# Patient Record
Sex: FEMALE | Race: WHITE | NOT HISPANIC OR LATINO | Employment: FULL TIME | URBAN - METROPOLITAN AREA
[De-identification: names, ages, dates, MRNs, and addresses within clinical notes are randomized per-mention and may not be internally consistent; named-entity substitution may affect disease eponyms.]

---

## 2017-01-02 ENCOUNTER — TELEPHONE (OUTPATIENT)
Dept: PSYCHIATRY | Facility: HOSPITAL | Age: 22
End: 2017-01-02

## 2017-01-03 ENCOUNTER — TELEPHONE (OUTPATIENT)
Dept: PSYCHIATRY | Facility: HOSPITAL | Age: 22
End: 2017-01-03

## 2017-06-03 ENCOUNTER — HOSPITAL ENCOUNTER (INPATIENT)
Facility: HOSPITAL | Age: 22
LOS: 1 days | End: 2017-06-04
Attending: EMERGENCY MEDICINE | Admitting: HOSPITALIST

## 2017-06-03 ENCOUNTER — APPOINTMENT (OUTPATIENT)
Dept: CT IMAGING | Facility: HOSPITAL | Age: 22
End: 2017-06-03

## 2017-06-03 DIAGNOSIS — T39.1X2A TYLENOL OVERDOSE, INTENTIONAL SELF-HARM, INITIAL ENCOUNTER (HCC): Primary | ICD-10-CM

## 2017-06-03 PROBLEM — R45.851 SUICIDAL IDEATION: Status: ACTIVE | Noted: 2017-06-03

## 2017-06-03 PROBLEM — T39.1X1A TYLENOL OVERDOSE: Status: ACTIVE | Noted: 2017-06-03

## 2017-06-03 PROBLEM — D64.9 ANEMIA: Status: ACTIVE | Noted: 2017-06-03

## 2017-06-03 PROBLEM — D68.00 VON WILLEBRAND DISEASE (HCC): Status: ACTIVE | Noted: 2017-06-03

## 2017-06-03 LAB
ALBUMIN SERPL-MCNC: 3.9 G/DL (ref 3.5–5.2)
ALBUMIN/GLOB SERPL: 1.6 G/DL
ALP SERPL-CCNC: 53 U/L (ref 39–117)
ALT SERPL W P-5'-P-CCNC: 11 U/L (ref 1–33)
AMPHET+METHAMPHET UR QL: NEGATIVE
ANION GAP SERPL CALCULATED.3IONS-SCNC: 10.2 MMOL/L
APAP SERPL-MCNC: 36 MCG/ML (ref 10–30)
APAP SERPL-MCNC: 89 MCG/ML (ref 10–30)
AST SERPL-CCNC: 17 U/L (ref 1–32)
BARBITURATES UR QL SCN: NEGATIVE
BASOPHILS # BLD AUTO: 0.02 10*3/MM3 (ref 0–0.2)
BASOPHILS NFR BLD AUTO: 0.3 % (ref 0–1.5)
BENZODIAZ UR QL SCN: NEGATIVE
BILIRUB SERPL-MCNC: <0.2 MG/DL (ref 0.1–1.2)
BUN BLD-MCNC: 13 MG/DL (ref 6–20)
BUN/CREAT SERPL: 15.7 (ref 7–25)
CALCIUM SPEC-SCNC: 8.2 MG/DL (ref 8.6–10.5)
CANNABINOIDS SERPL QL: NEGATIVE
CHLORIDE SERPL-SCNC: 105 MMOL/L (ref 98–107)
CO2 SERPL-SCNC: 23.8 MMOL/L (ref 22–29)
COCAINE UR QL: NEGATIVE
CREAT BLD-MCNC: 0.83 MG/DL (ref 0.57–1)
DEPRECATED RDW RBC AUTO: 45.8 FL (ref 37–54)
EOSINOPHIL # BLD AUTO: 0.15 10*3/MM3 (ref 0–0.7)
EOSINOPHIL NFR BLD AUTO: 2.6 % (ref 0.3–6.2)
ERYTHROCYTE [DISTWIDTH] IN BLOOD BY AUTOMATED COUNT: 13.7 % (ref 11.7–13)
ETHANOL BLD-MCNC: <10 MG/DL (ref 0–10)
ETHANOL UR QL: <0.01 %
GFR SERPL CREATININE-BSD FRML MDRD: 85 ML/MIN/1.73
GLOBULIN UR ELPH-MCNC: 2.4 GM/DL
GLUCOSE BLD-MCNC: 106 MG/DL (ref 65–99)
HCG SERPL QL: NEGATIVE
HCT VFR BLD AUTO: 35.1 % (ref 35.6–45.5)
HGB BLD-MCNC: 11.5 G/DL (ref 11.9–15.5)
IMM GRANULOCYTES # BLD: 0 10*3/MM3 (ref 0–0.03)
IMM GRANULOCYTES NFR BLD: 0 % (ref 0–0.5)
INR PPP: 1.25 (ref 0.9–1.1)
INR PPP: 1.34 (ref 0.9–1.1)
LYMPHOCYTES # BLD AUTO: 2.93 10*3/MM3 (ref 0.9–4.8)
LYMPHOCYTES NFR BLD AUTO: 50.3 % (ref 19.6–45.3)
MCH RBC QN AUTO: 29.9 PG (ref 26.9–32)
MCHC RBC AUTO-ENTMCNC: 32.8 G/DL (ref 32.4–36.3)
MCV RBC AUTO: 91.4 FL (ref 80.5–98.2)
METHADONE UR QL SCN: NEGATIVE
MONOCYTES # BLD AUTO: 0.42 10*3/MM3 (ref 0.2–1.2)
MONOCYTES NFR BLD AUTO: 7.2 % (ref 5–12)
NEUTROPHILS # BLD AUTO: 2.31 10*3/MM3 (ref 1.9–8.1)
NEUTROPHILS NFR BLD AUTO: 39.6 % (ref 42.7–76)
OPIATES UR QL: NEGATIVE
OXYCODONE UR QL SCN: NEGATIVE
PLATELET # BLD AUTO: 293 10*3/MM3 (ref 140–500)
PMV BLD AUTO: 9.1 FL (ref 6–12)
POTASSIUM BLD-SCNC: 4.1 MMOL/L (ref 3.5–5.2)
PROT SERPL-MCNC: 6.3 G/DL (ref 6–8.5)
PROTHROMBIN TIME: 15.2 SECONDS (ref 11.7–14.2)
PROTHROMBIN TIME: 16.1 SECONDS (ref 11.7–14.2)
RBC # BLD AUTO: 3.84 10*6/MM3 (ref 3.9–5.2)
SALICYLATES SERPL-MCNC: <0.3 MG/DL
SODIUM BLD-SCNC: 139 MMOL/L (ref 136–145)
WBC NRBC COR # BLD: 5.83 10*3/MM3 (ref 4.5–10.7)

## 2017-06-03 PROCEDURE — 80307 DRUG TEST PRSMV CHEM ANLYZR: CPT | Performed by: EMERGENCY MEDICINE

## 2017-06-03 PROCEDURE — 99285 EMERGENCY DEPT VISIT HI MDM: CPT

## 2017-06-03 PROCEDURE — 25010000002 ONDANSETRON PER 1 MG

## 2017-06-03 PROCEDURE — 85610 PROTHROMBIN TIME: CPT | Performed by: EMERGENCY MEDICINE

## 2017-06-03 PROCEDURE — 90791 PSYCH DIAGNOSTIC EVALUATION: CPT

## 2017-06-03 PROCEDURE — 80053 COMPREHEN METABOLIC PANEL: CPT | Performed by: EMERGENCY MEDICINE

## 2017-06-03 PROCEDURE — 74176 CT ABD & PELVIS W/O CONTRAST: CPT

## 2017-06-03 PROCEDURE — 85025 COMPLETE CBC W/AUTO DIFF WBC: CPT | Performed by: EMERGENCY MEDICINE

## 2017-06-03 PROCEDURE — 84703 CHORIONIC GONADOTROPIN ASSAY: CPT | Performed by: EMERGENCY MEDICINE

## 2017-06-03 PROCEDURE — 85610 PROTHROMBIN TIME: CPT | Performed by: HOSPITALIST

## 2017-06-03 PROCEDURE — 80307 DRUG TEST PRSMV CHEM ANLYZR: CPT | Performed by: HOSPITALIST

## 2017-06-03 RX ORDER — SODIUM CHLORIDE 9 MG/ML
75 INJECTION, SOLUTION INTRAVENOUS CONTINUOUS
Status: ACTIVE | OUTPATIENT
Start: 2017-06-03 | End: 2017-06-04

## 2017-06-03 RX ORDER — ONDANSETRON 2 MG/ML
4 INJECTION INTRAMUSCULAR; INTRAVENOUS ONCE
Status: COMPLETED | OUTPATIENT
Start: 2017-06-03 | End: 2017-06-03

## 2017-06-03 RX ORDER — SODIUM CHLORIDE 0.9 % (FLUSH) 0.9 %
1-10 SYRINGE (ML) INJECTION AS NEEDED
Status: DISCONTINUED | OUTPATIENT
Start: 2017-06-03 | End: 2017-06-04 | Stop reason: HOSPADM

## 2017-06-03 RX ORDER — ONDANSETRON 2 MG/ML
INJECTION INTRAMUSCULAR; INTRAVENOUS
Status: COMPLETED
Start: 2017-06-03 | End: 2017-06-03

## 2017-06-03 RX ORDER — ONDANSETRON 2 MG/ML
4 INJECTION INTRAMUSCULAR; INTRAVENOUS EVERY 6 HOURS PRN
Status: DISCONTINUED | OUTPATIENT
Start: 2017-06-03 | End: 2017-06-04 | Stop reason: HOSPADM

## 2017-06-03 RX ADMIN — ACETYLCYSTEINE 7840 MG: 200 INJECTION, SOLUTION INTRAVENOUS at 08:07

## 2017-06-03 RX ADMIN — ACETYLCYSTEINE 2620 MG: 6 INJECTION, SOLUTION INTRAVENOUS at 11:31

## 2017-06-03 RX ADMIN — ACETYLCYSTEINE 5220 MG: 200 INJECTION, SOLUTION INTRAVENOUS at 15:24

## 2017-06-03 RX ADMIN — SODIUM CHLORIDE 75 ML/HR: 9 INJECTION, SOLUTION INTRAVENOUS at 23:49

## 2017-06-03 RX ADMIN — ONDANSETRON 4 MG: 2 INJECTION INTRAMUSCULAR; INTRAVENOUS at 08:40

## 2017-06-03 RX ADMIN — SODIUM CHLORIDE 75 ML/HR: 9 INJECTION, SOLUTION INTRAVENOUS at 10:56

## 2017-06-03 RX ADMIN — ACETYLCYSTEINE 7840 MG: 6 INJECTION, SOLUTION INTRAVENOUS at 10:35

## 2017-06-03 RX ADMIN — SODIUM CHLORIDE 1000 ML: 9 INJECTION, SOLUTION INTRAVENOUS at 08:18
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
## 2017-06-03 NOTE — H&P
[facility]    Patient Identification:  Name: Damien Andrade  Age: 21 y.o.  Sex: female  :  1995  MRN: [id number]         Primary Care Physician: Tila Lawson MD    Chief complaint:  Overdose    Subjective   Patient is a 21 y.o. female took approximately 20*strength Tylenol at 11 PM last night with suicidal ideation. She also had 4 drinks prior. She has had some abdominal discomfort as well as nausea that has just happened on the floor. She denies any fevers, chills, chest pain. Tylenol level was 89 at 0637 which is above the treatment threshold on the nomogram.    Past Medical History:   Diagnosis Date   • Von Willebrand disease    • Willebrand-Juergens disease      Past Surgical History:   Procedure Laterality Date   • INCISION AND DRAINAGE ABSCESS Right     right leg     Current medications reviewed.    History reviewed. No pertinent family history.  Social History   Substance Use Topics   • Smoking status: Current Some Day Smoker   • Smokeless tobacco: None      Comment: socially    • Alcohol use Yes      Comment: socially      Review of Systems   Constitutional: Negative.  Negative for chills and fever.   HENT: Negative.    Eyes: Negative.    Respiratory: Negative.  Negative for chest tightness and shortness of breath.    Cardiovascular: Negative.  Negative for chest pain.   Gastrointestinal: Positive for abdominal pain and nausea. Negative for diarrhea and vomiting.   Endocrine: Negative.    Genitourinary: Negative.    Musculoskeletal: Negative.    Skin: Negative.    Allergic/Immunologic: Negative.    Neurological: Negative.    Hematological: Negative.    Psychiatric/Behavioral: Positive for self-injury and suicidal ideas.      Objective      Vital Signs  Temp:  [97.6 °F (36.4 °C)-98.2 °F (36.8 °C)] 98.2 °F (36.8 °C)  Heart Rate:  [47-65] 65  Resp:  [16-20] 18  BP: ()/(52-91) 107/71  Body mass index is 20.37 kg/(m^2).    Physical  Exam   Constitutional: She is oriented to person, place, and time. She appears well-developed and well-nourished.  Non-toxic appearance. She does not have a sickly appearance. She appears ill. No distress.   HENT:   Head: Normocephalic and atraumatic.   Eyes: Conjunctivae and EOM are normal.   Neck: Neck supple. No tracheal deviation present.   Cardiovascular: Normal rate, regular rhythm and intact distal pulses.    Pulmonary/Chest: Effort normal and breath sounds normal. No respiratory distress. She has no wheezes. She has no rales.   Abdominal: Soft. She exhibits no distension. There is generalized tenderness (mild). There is no rebound and no guarding.   Musculoskeletal: She exhibits no edema.   Lymphadenopathy:     She has no cervical adenopathy.   Neurological: She is alert and oriented to person, place, and time.   Skin: Skin is warm and dry.   Psychiatric: Her speech is normal. She exhibits a depressed mood.     Lab Results   Component Value Date    WBC 5.83 06/03/2017    HGB 11.5 (L) 06/03/2017    HCT 35.1 (L) 06/03/2017     06/03/2017     Lab Results   Component Value Date     06/03/2017    K 4.1 06/03/2017     06/03/2017    CO2 23.8 06/03/2017    BUN 13 06/03/2017    CREATININE 0.83 06/03/2017    GLUCOSE 106 (H) 06/03/2017     Lab Results   Component Value Date    CALCIUM 8.2 (L) 06/03/2017     Lab Results   Component Value Date    AST 17 06/03/2017    ALT 11 06/03/2017    ALKPHOS 53 06/03/2017     Lab Results   Component Value Date    INR 1.25 (H) 06/03/2017     Estimated Creatinine Clearance: 86.9 mL/min (by C-G formula based on Cr of 0.83).    Assessment/Plan   Active Hospital Problems (** Indicates Principal Problem)    Diagnosis Date Noted   • **Intentional acetaminophen overdose [T39.1X2A] 06/03/2017   • Suicidal ideation [R45.851] 06/03/2017   • Von Willebrand disease [D68.0] 06/03/2017   • Anemia [D64.9] 06/03/2017   • Depression, major, recurrent [F33.9] 12/19/2016      Resolved  Hospital Problems    Diagnosis Date Noted Date Resolved   No resolved problems to display.     · 21 y.o. female with intentional Tylenol overdose (about 10 grams), level above treatment line  · Continue N-acetylcysteine protocol. Looks like it was started about 9 hours after ingestion in ER.  · Monitor liver function tests, Tylenol level, hgb.  · Access consult, sitter, 72 hour hold  · Check CT abdomen and pelvis given abdominal pain, nausea  · Discussed findings and recommendations with patient, mother. Patient/mother were given opportunity to ask questions. Further management as patient's clinical course unfolds.  · Discussed with ER MD, RN.    Eric Ruth MD  06/03/17  10:45 AM

## 2017-06-03 NOTE — PLAN OF CARE
Problem: Patient Care Overview (Adult)  Goal: Plan of Care Review  Outcome: Ongoing (interventions implemented as appropriate)    06/03/17 1117   Coping/Psychosocial Response Interventions   Plan Of Care Reviewed With patient;mother   Patient Care Overview   Progress no change   Outcome Evaluation   Outcome Summary/Follow up Plan Presents w/N&V; Zofran given around 8am. So none due until 14:00. Started IV NS @ 75. Acetodote not due until 12:00 over 4 hours. Then next due around 16:00, also over 4 hours. Pt very anxious about blood draws, IVs, etc. Mother and sitter @ bedside. 72-hr hold/suicide precautions. Access consulted. Will continue to monitor.         Problem: Suicide Risk (Adult,Obstetrics,Pediatric)  Goal: Strength-Based Wellness/Recovery  Outcome: Ongoing (interventions implemented as appropriate)    06/03/17 1117   Suicide Risk (Adult,Obstetrics,Pediatric)   Strength-Based Wellness/Recovery making progress toward outcome       Goal: Physical Safety  Outcome: Ongoing (interventions implemented as appropriate)    06/03/17 1117   Suicide Risk (Adult,Obstetrics,Pediatric)   Physical Safety making progress toward outcome

## 2017-06-03 NOTE — ED PROVIDER NOTES
EMERGENCY DEPARTMENT ENCOUNTER    CHIEF COMPLAINT  Chief Complaint: overdose  History given by: pt  History limited by: none  Room Number: 06/06  PMD: Tila Lawson MD      HPI:  Pt is a 21 y.o. female who presents complaining of a drug overdose. Pt states that she took about 20 extra strength tylenol at 2300 last night. Pt states that she took the tylenol with SI, and states a Hx of SI. Pt also states that she does not have a psychiatrist. In addition to the tylenol, pt states that she also drank EtOH last night. Pt denies abd pain, N/V.     Duration:  Since 2300 last night  Onset: s/p taking 20 extra strength tylenol  Timing: constant   Quality: Tylenol  Intensity/Severity: moderate  Progression: unchanged  Associated Symptoms: none stated  Aggravating Factors: none stated  Alleviating Factors: none stated  Previous Episodes: Hx of SI  Treatment before arrival: none    PAST MEDICAL HISTORY  Active Ambulatory Problems     Diagnosis Date Noted   • Depression, major, recurrent 12/19/2016     Resolved Ambulatory Problems     Diagnosis Date Noted   • No Resolved Ambulatory Problems     Past Medical History:   Diagnosis Date   • Von Willebrand disease    • Willebrand-Juergens disease        PAST SURGICAL HISTORY  Past Surgical History:   Procedure Laterality Date   • INCISION AND DRAINAGE ABSCESS Right     right leg       FAMILY HISTORY  History reviewed. No pertinent family history.    SOCIAL HISTORY  Social History     Social History   • Marital status: Single     Spouse name: N/A   • Number of children: N/A   • Years of education: N/A     Occupational History   • Not on file.     Social History Main Topics   • Smoking status: Current Some Day Smoker   • Smokeless tobacco: Not on file      Comment: socially    • Alcohol use Yes      Comment: socially    • Drug use: No   • Sexual activity: Not on file     Other Topics Concern   • Not on file     Social History Narrative   • No narrative on file        ALLERGIES  Peanut oil and Peanut-containing drug products    REVIEW OF SYSTEMS  Review of Systems   Constitutional: Negative for fever.        Tylenol overdose   HENT: Negative for sore throat.    Eyes: Negative.    Respiratory: Negative for cough and shortness of breath.    Cardiovascular: Negative for chest pain.   Gastrointestinal: Negative for abdominal pain, diarrhea and vomiting.   Genitourinary: Negative for dysuria.   Musculoskeletal: Negative for neck pain.   Skin: Negative for rash.   Allergic/Immunologic: Negative.    Neurological: Negative for weakness, numbness and headaches.   Hematological: Negative.    Psychiatric/Behavioral: Negative.    All other systems reviewed and are negative.      PHYSICAL EXAM  ED Triage Vitals   Temp Heart Rate Resp BP SpO2   06/03/17 0624 06/03/17 0624 06/03/17 0624 06/03/17 0624 06/03/17 0624   97.6 °F (36.4 °C) 47 20 92/63 100 %      Temp src Heart Rate Source Patient Position BP Location FiO2 (%)   06/03/17 0624 -- -- -- --   Tympanic           Physical Exam   Constitutional: She is oriented to person, place, and time and well-developed, well-nourished, and in no distress. No distress.   Pt tearful   HENT:   Head: Normocephalic and atraumatic.   Eyes: EOM are normal. Pupils are equal, round, and reactive to light.   Neck: Normal range of motion. Neck supple.   Cardiovascular: Normal rate, regular rhythm and normal heart sounds.    Pulmonary/Chest: Effort normal and breath sounds normal. No respiratory distress.   Abdominal: Soft. There is no tenderness. There is no rebound and no guarding.   Musculoskeletal: Normal range of motion. She exhibits no edema.   Neurological: She is alert and oriented to person, place, and time. She has normal sensation and normal strength.   Skin: Skin is warm and dry. No rash noted.   Psychiatric: Mood and affect normal.   Nursing note and vitals reviewed.      LAB RESULTS  Lab Results (last 24 hours)     Procedure Component Value  Units Date/Time    CBC & Differential [[id number]] Collected:  06/03/17 0637    Specimen:  Blood Updated:  06/03/17 0650    Narrative:       The following orders were created for panel order CBC & Differential.  Procedure                               Abnormality         Status                     ---------                               -----------         ------                     CBC Auto Differential[[id number]]        Abnormal            Final result                 Please view results for these tests on the individual orders.    Comprehensive Metabolic Panel [[id number]]  (Abnormal) Collected:  06/03/17 0637    Specimen:  Blood Updated:  06/03/17 0715     Glucose 106 (H) mg/dL      BUN 13 mg/dL      Creatinine 0.83 mg/dL      Sodium 139 mmol/L      Potassium 4.1 mmol/L      Chloride 105 mmol/L      CO2 23.8 mmol/L      Calcium 8.2 (L) mg/dL      Total Protein 6.3 g/dL      Albumin 3.90 g/dL      ALT (SGPT) 11 U/L      AST (SGOT) 17 U/L      Alkaline Phosphatase 53 U/L      Total Bilirubin <0.2 mg/dL      eGFR Non African Amer 85 mL/min/1.73      Globulin 2.4 gm/dL      A/G Ratio 1.6 g/dL      BUN/Creatinine Ratio 15.7     Anion Gap 10.2 mmol/L     Protime-INR [[id number]]  (Abnormal) Collected:  06/03/17 0637    Specimen:  Blood Updated:  06/03/17 0705     Protime 15.2 (H) Seconds      INR 1.25 (H)    Ethanol [[id number]] Collected:  06/03/17 0637    Specimen:  Blood Updated:  06/03/17 0708     Ethanol <10 mg/dL      Ethanol % <0.010 %     Acetaminophen Level [[id number]]  (Abnormal) Collected:  06/03/17 0637    Specimen:  Blood Updated:  06/03/17 0712     Acetaminophen 89.0 (C) mcg/mL     Salicylate Level [[id number]] Collected:  06/03/17 0637    Specimen:  Blood Updated:  06/03/17 0708     Salicylate <0.3 mg/dL     Narrative:       Therapeutic range for Salicylates:  3.0 - 10.0 mg/dL for antipyretic/analgesic conditions  15.0 - 30.0 mg/dL for anti-inflammatory conditions    CBC Auto Differential [[id number]]   (Abnormal) Collected:  06/03/17 0637    Specimen:  Blood Updated:  06/03/17 0650     WBC 5.83 10*3/mm3      RBC 3.84 (L) 10*6/mm3      Hemoglobin 11.5 (L) g/dL      Hematocrit 35.1 (L) %      MCV 91.4 fL      MCH 29.9 pg      MCHC 32.8 g/dL      RDW 13.7 (H) %      RDW-SD 45.8 fl      MPV 9.1 fL      Platelets 293 10*3/mm3      Neutrophil % 39.6 (L) %      Lymphocyte % 50.3 (H) %      Monocyte % 7.2 %      Eosinophil % 2.6 %      Basophil % 0.3 %      Immature Grans % 0.0 %      Neutrophils, Absolute 2.31 10*3/mm3      Lymphocytes, Absolute 2.93 10*3/mm3      Monocytes, Absolute 0.42 10*3/mm3      Eosinophils, Absolute 0.15 10*3/mm3      Basophils, Absolute 0.02 10*3/mm3      Immature Grans, Absolute 0.00 10*3/mm3     Urine Drug Screen [[id number]]  (Normal) Collected:  06/03/17 0719    Specimen:  Urine from Urine, Clean Catch Updated:  06/03/17 0752     Amphet/Methamphet, Screen Negative     Barbiturates Screen, Urine Negative     Benzodiazepine Screen, Urine Negative     Cocaine Screen, Urine Negative     Opiate Screen Negative     THC, Screen, Urine Negative     Methadone Screen, Urine Negative     Oxycodone Screen, Urine Negative    Narrative:       Negative Thresholds For Drugs Screened:     Amphetamines               500 ng/ml   Barbiturates               200 ng/ml   Benzodiazepines            100 ng/ml   Cocaine                    300 ng/ml   Methadone                  300 ng/ml   Opiates                    300 ng/ml   Oxycodone                  100 ng/ml   THC                        50 ng/ml    The Normal Value for all drugs tested is negative. This report includes final unconfirmed screening results to be used for medical treatment purposes only. Unconfirmed results must not be used for non-medical purposes such as employment or legal testing. Clinical consideration should be applied to any drug of abuse test, particulary when unconfirmed results are used.    hCG, Serum, Qualitative [[id number]] Collected:   06/03/17 0739    Specimen:  Blood Updated:  06/03/17 0750          I ordered the above labs and reviewed the results    PROCEDURES  Procedures      PROGRESS AND CONSULTS  ED Course   0733  Ordered acetadote for tylenol toxicity, consult to [facility]  0759  Received a call from Dr. Ruth and discussed pt's case. Dr. Ruth agreed to admit the pt.      MEDICAL DECISION MAKING  Results were reviewed/discussed with the patient and they were also made aware of online access. Pt also made aware that some labs, such as cultures, will not be resulted during ER visit and follow up with PMD is necessary.     MDM  Number of Diagnoses or Management Options     Amount and/or Complexity of Data Reviewed  Clinical lab tests: ordered and reviewed (Aceta 89.0, Protime 15.2, INR 1.25, Ca 8.2)  Decide to obtain previous medical records or to obtain history from someone other than the patient: yes    Patient Progress  Patient progress: stable         DIAGNOSIS  Final diagnoses:   Tylenol overdose, intentional self-harm, initial encounter       DISPOSITION  ADMISSION by Dr. Ruth    Discussed treatment plan and reason for admission with pt/family and admitting physician.  Pt/family voiced understanding of the plan for admission for further testing/treatment as needed.       Latest Documented Vital Signs:  As of 8:04 AM  BP- 97/79 HR- (!) 47 Temp- 97.6 °F (36.4 °C) (Tympanic) O2 sat- 100%    --  Documentation assistance provided by jean-pierre Hand for Dr. Turner.  Information recorded by the jean-pierre was done at my direction and has been verified and validated by me.     Pasquale Hand  06/03/17 0805       Guy Turner MD  06/03/17 1563
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
## 2017-06-03 NOTE — PLAN OF CARE
Problem: Overdose, Ingestion/Inhalants (Adult)  Goal: Signs and Symptoms of Listed Potential Problems Will be Absent or Manageable (Overdose, Ingestion/Inhalants)  Outcome: Ongoing (interventions implemented as appropriate)    Problem: Anxiety (Adult)  Goal: Reduction/Resolution  Outcome: Ongoing (interventions implemented as appropriate)    06/03/17 1116   Anxiety (Adult)   Reduction/Resolution making progress toward outcome

## 2017-06-04 ENCOUNTER — HOSPITAL ENCOUNTER (INPATIENT)
Facility: HOSPITAL | Age: 22
LOS: 2 days | Discharge: HOME | End: 2017-06-06
Attending: SPECIALIST | Admitting: SPECIALIST

## 2017-06-04 VITALS
BODY MASS INDEX: 20.2 KG/M2 | RESPIRATION RATE: 17 BRPM | SYSTOLIC BLOOD PRESSURE: 98 MMHG | TEMPERATURE: 98.7 F | HEIGHT: 63 IN | DIASTOLIC BLOOD PRESSURE: 66 MMHG | WEIGHT: 114 LBS | OXYGEN SATURATION: 96 % | HEART RATE: 60 BPM

## 2017-06-04 PROBLEM — F33.9 MAJOR DEPRESSION, RECURRENT (HCC): Status: ACTIVE | Noted: 2017-06-04

## 2017-06-04 LAB
ALBUMIN SERPL-MCNC: 3.4 G/DL (ref 3.5–5.2)
ALBUMIN/GLOB SERPL: 1.8 G/DL
ALP SERPL-CCNC: 42 U/L (ref 39–117)
ALT SERPL W P-5'-P-CCNC: 10 U/L (ref 1–33)
ANION GAP SERPL CALCULATED.3IONS-SCNC: 14.3 MMOL/L
APAP SERPL-MCNC: <5 MCG/ML (ref 10–30)
AST SERPL-CCNC: 14 U/L (ref 1–32)
BILIRUB SERPL-MCNC: <0.2 MG/DL (ref 0.1–1.2)
BUN BLD-MCNC: 10 MG/DL (ref 6–20)
BUN/CREAT SERPL: 12.7 (ref 7–25)
CALCIUM SPEC-SCNC: 8 MG/DL (ref 8.6–10.5)
CHLORIDE SERPL-SCNC: 110 MMOL/L (ref 98–107)
CO2 SERPL-SCNC: 19.7 MMOL/L (ref 22–29)
CREAT BLD-MCNC: 0.79 MG/DL (ref 0.57–1)
DEPRECATED RDW RBC AUTO: 47.3 FL (ref 37–54)
ERYTHROCYTE [DISTWIDTH] IN BLOOD BY AUTOMATED COUNT: 13.8 % (ref 11.7–13)
GFR SERPL CREATININE-BSD FRML MDRD: 90 ML/MIN/1.73
GLOBULIN UR ELPH-MCNC: 1.9 GM/DL
GLUCOSE BLD-MCNC: 109 MG/DL (ref 65–99)
HCT VFR BLD AUTO: 32.6 % (ref 35.6–45.5)
HGB BLD-MCNC: 10.5 G/DL (ref 11.9–15.5)
INR PPP: 1.4 (ref 0.9–1.1)
MCH RBC QN AUTO: 30.2 PG (ref 26.9–32)
MCHC RBC AUTO-ENTMCNC: 32.2 G/DL (ref 32.4–36.3)
MCV RBC AUTO: 93.7 FL (ref 80.5–98.2)
PLATELET # BLD AUTO: 245 10*3/MM3 (ref 140–500)
PMV BLD AUTO: 9.8 FL (ref 6–12)
POTASSIUM BLD-SCNC: 3.8 MMOL/L (ref 3.5–5.2)
PROT SERPL-MCNC: 5.3 G/DL (ref 6–8.5)
PROTHROMBIN TIME: 16.7 SECONDS (ref 11.7–14.2)
RBC # BLD AUTO: 3.48 10*6/MM3 (ref 3.9–5.2)
SODIUM BLD-SCNC: 144 MMOL/L (ref 136–145)
T4 FREE SERPL-MCNC: 1.06 NG/DL (ref 0.93–1.7)
TSH SERPL DL<=0.05 MIU/L-ACNC: 0.56 MIU/ML (ref 0.27–4.2)
WBC NRBC COR # BLD: 4.97 10*3/MM3 (ref 4.5–10.7)

## 2017-06-04 PROCEDURE — 84443 ASSAY THYROID STIM HORMONE: CPT | Performed by: PSYCHIATRY & NEUROLOGY

## 2017-06-04 PROCEDURE — 85610 PROTHROMBIN TIME: CPT | Performed by: HOSPITALIST

## 2017-06-04 PROCEDURE — 85027 COMPLETE CBC AUTOMATED: CPT | Performed by: HOSPITALIST

## 2017-06-04 PROCEDURE — 80307 DRUG TEST PRSMV CHEM ANLYZR: CPT | Performed by: HOSPITALIST

## 2017-06-04 PROCEDURE — 80053 COMPREHEN METABOLIC PANEL: CPT | Performed by: HOSPITALIST

## 2017-06-04 PROCEDURE — 84439 ASSAY OF FREE THYROXINE: CPT | Performed by: PSYCHIATRY & NEUROLOGY

## 2017-06-04 RX ORDER — MAGNESIUM HYDROXIDE/ALUMINUM HYDROXICE/SIMETHICONE 120; 1200; 1200 MG/30ML; MG/30ML; MG/30ML
30 SUSPENSION ORAL EVERY 6 HOURS PRN
Status: DISCONTINUED | OUTPATIENT
Start: 2017-06-04 | End: 2017-06-06 | Stop reason: HOSPADM

## 2017-06-04 RX ORDER — IBUPROFEN 600 MG/1
600 TABLET ORAL EVERY 6 HOURS PRN
Status: DISCONTINUED | OUTPATIENT
Start: 2017-06-04 | End: 2017-06-05

## 2017-06-04 NOTE — PLAN OF CARE
Problem: Patient Care Overview (Adult)  Goal: Plan of Care Review  Outcome: Ongoing (interventions implemented as appropriate)    06/04/17 0524   Coping/Psychosocial Response Interventions   Plan Of Care Reviewed With patient   Patient Care Overview   Progress no change   Outcome Evaluation   Outcome Summary/Follow up Plan patient has had no complaints of nausea/ vomiting and has been tolerating regular diet, patient remains withdrawn and depressed; mother stayed with patient last night.. sitter present at bedside monitoring behavior, patient remains on 72-hr hold/suisice precautions, VSS, continue to monitor acess is following         Problem: Suicide Risk (Adult,Obstetrics,Pediatric)  Goal: Identify Related Risk Factors and Signs and Symptoms  Outcome: Outcome(s) achieved Date Met:  06/04/17  Goal: Strength-Based Wellness/Recovery  Outcome: Ongoing (interventions implemented as appropriate)  Goal: Physical Safety  Outcome: Ongoing (interventions implemented as appropriate)    Problem: Overdose, Ingestion/Inhalants (Adult)  Goal: Signs and Symptoms of Listed Potential Problems Will be Absent or Manageable (Overdose, Ingestion/Inhalants)  Outcome: Ongoing (interventions implemented as appropriate)    Problem: Anxiety (Adult)  Goal: Identify Related Risk Factors and Signs and Symptoms  Outcome: Outcome(s) achieved Date Met:  06/04/17  Goal: Reduction/Resolution  Outcome: Ongoing (interventions implemented as appropriate)

## 2017-06-04 NOTE — PLAN OF CARE
Problem: Patient Care Overview (Adult)  Goal: Plan of Care Review  Outcome: Ongoing (interventions implemented as appropriate)    06/04/17 1025   Coping/Psychosocial Response Interventions   Plan Of Care Reviewed With patient   Patient Care Overview   Progress improving   Outcome Evaluation   Outcome Summary/Follow up Plan No N, V this am. Sitter and mom at BS. Tolerating regular diet well. VSS, labs much better this am, although hgb decrease of 1 full point from 11.5 to 10.5 is noteworthy. Pt wishes to shower (w/sitter in the room). MD/Philippe to validate if medically cleared to go to [facility] today. Will continue to monitor.         Problem: Suicide Risk (Adult,Obstetrics,Pediatric)  Goal: Strength-Based Wellness/Recovery  Outcome: Ongoing (interventions implemented as appropriate)    06/04/17 1025   Suicide Risk (Adult,Obstetrics,Pediatric)   Strength-Based Wellness/Recovery making progress toward outcome         Problem: Overdose, Ingestion/Inhalants (Adult)  Goal: Signs and Symptoms of Listed Potential Problems Will be Absent or Manageable (Overdose, Ingestion/Inhalants)    06/04/17 1025   Overdose, Ingestion/Inhalants   Problems Assessed (Overdose) all   Problems Present (Overdose) suicide risk         Problem: Anxiety (Adult)  Goal: Reduction/Resolution    06/04/17 1025   Anxiety (Adult)   Reduction/Resolution making progress toward outcome

## 2017-06-04 NOTE — DISCHARGE SUMMARY
NAME: Damien Andrade ADMIT: 6/3/2017   : 1995  PCP: Tila Lawson MD    MRN: [id number] LOS: 1 days   AGE/SEX: 21 y.o. female  ROOM: [facility]     Date of Admission:  6/3/2017  Date of Discharge:  2017    PCP: Tila Lawson MD    CHIEF COMPLAINT  Suicide Attempt; Alcohol Intoxication; and Drug Overdose      DISCHARGE DIAGNOSIS  Active Hospital Problems (** Indicates Principal Problem)    Diagnosis Date Noted   • **Intentional acetaminophen overdose [T39.1X2A] 2017   • Suicidal ideation [R45.851] 2017   • Von Willebrand disease [D68.0] 2017   • Anemia [D64.9] 2017   • Depression, major, recurrent [F33.9] 2016      Resolved Hospital Problems    Diagnosis Date Noted Date Resolved   No resolved problems to display.       SECONDARY DIAGNOSES  Past Medical History:   Diagnosis Date   • Von Willebrand disease    • Willebrand-Juergens disease        CONSULTS   Access Psychaitry    HOSPITAL COURSE  Patient is a 21 y.o. female presented to [facility] with an intentional tylenol ingestion after taking 20x tylenol and 4 alcohol drinks as a suicide attempt. Tylenol level was 89 on admission.    She was started on the NAC protocol. Repeat tylenol levels  were undetectable. LFTs normal and no GI symptoms. She can be transferred to Psychiatry [facility] for treatment of her depression. Recommend no tylenol be given for the next week and follow up with her PCP after dc for repeat LFTs testing in a week or two.         DIAGNOSTICS  CBC (No Diff) [[id number]] (Abnormal) Collected: 17        Lab Status: Final result Specimen: Blood Updated: 17        WBC 4.97 10*3/mm3         RBC 3.48 (L) 10*6/mm3         Hemoglobin 10.5 (L) g/dL         Hematocrit 32.6 (L) %         MCV 93.7 fL         MCH 30.2 pg         MCHC 32.2 (L) g/dL         RDW 13.8 (H) %         RDW-SD 47.3 fl         MPV 9.8 fL         Platelets 245 10*3/mm3        Comprehensive  Metabolic Panel [[id number]] (Abnormal) Collected: 06/04/17 0722       Lab Status: Final result Specimen: Blood Updated: 06/04/17 0829        Glucose 109 (H) mg/dL         BUN 10 mg/dL         Creatinine 0.79 mg/dL         Sodium 144 mmol/L         Potassium 3.8 mmol/L         Chloride 110 (H) mmol/L         CO2 19.7 (L) mmol/L         Calcium 8.0 (L) mg/dL         Total Protein 5.3 (L) g/dL         Albumin 3.40 (L) g/dL         ALT (SGPT) 10 U/L         AST (SGOT) 14 U/L         Alkaline Phosphatase 42 U/L         Total Bilirubin <0.2 mg/dL         eGFR Non African Amer 90 mL/min/1.73         Globulin 1.9 gm/dL         A/G Ratio 1.8 g/dL         BUN/Creatinine Ratio 12.7        Anion Gap 14.3 mmol/L        Protime-INR [[id number]] (Abnormal) Collected: 06/04/17 0722       Lab Status: Final result Specimen: Blood Updated: 06/04/17 0740        Protime 16.7 (H) Seconds         INR 1.40 (H)       Acetaminophen Level [[id number]] (Abnormal) Collected: 06/04/17 0722       Lab Status: Final result Specimen: Blood Updated: 06/04/17 0818        Acetaminophen <5.0 (L) mcg/mL       hCG, Serum, Qualitative [[id number]] (Normal) Collected: 06/03/17 0739        Lab Status: Final result Specimen: Blood Updated: 06/03/17 0817        HCG Qualitative Negative       Urine Drug Screen [[id number]] (Normal) Collected: 06/03/17 0719       Lab Status: Final result Specimen: Urine from Urine, Clean Catch Updated: 06/03/17 0752        Amphet/Methamphet, Screen Negative        Barbiturates Screen, Urine Negative        Benzodiazepine Screen, Urine Negative        Cocaine Screen, Urine Negative        Opiate Screen Negative        THC, Screen, Urine Negative        Methadone Screen, Urine Negative        Oxycodone Screen, Urine Negative            PHYSICAL EXAM  Objective     Alert, nad  RRR  Soft, nt  No edema  CONDITION ON DISCHARGE  Stable.      DISCHARGE DISPOSITION   Psych[facility] Hospital [facility] Unit ([facility])      DISCHARGE  MEDICATIONS  There are no discharge medications for this patient.      No future appointments.    TEST  RESULTS PENDING AT DISCHARGE         Harlan Paez MD  [facility]  06/04/17  1:54 PM      Time: less than 29 minutes on discharge  It was a pleasure taking care of this patient while in the hospital.
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
## 2017-06-05 PROBLEM — Z78.9 ALCOHOL USE: Status: ACTIVE | Noted: 2017-06-05

## 2017-06-05 RX ORDER — IBUPROFEN 200 MG
200 TABLET ORAL EVERY 6 HOURS PRN
Status: DISCONTINUED | OUTPATIENT
Start: 2017-06-05 | End: 2017-06-06 | Stop reason: HOSPADM

## 2017-06-05 RX ORDER — ESCITALOPRAM OXALATE 5 MG/1
5 TABLET ORAL NIGHTLY
Status: DISCONTINUED | OUTPATIENT
Start: 2017-06-05 | End: 2017-06-06 | Stop reason: HOSPADM
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
## 2017-06-05 NOTE — CONSULTS
Inpatient Consult to Hospitalist  Consult performed by: ELIEZER STEWART  Consult ordered by: ARMIDA ZAFAR  Reason for consult: tylenol toxicity          Patient Care Team:  Tila Lawson MD as PCP - General (Internal Medicine)    Chief complaint: depressed    Subjective     History of Present Illness    Pt known to me from recent discharge. Patient is a 21 y.o. female presented to [facility] with an intentional tylenol ingestion after taking 20x tylenol and 4 alcohol drinks as a suicide attempt. Tylenol level was 89 on admission.     She was started on the NAC protocol. Repeat tylenol levels on 6/4 were undetectable. LFTs normal and no GI symptoms. She was transferred to Psychiatry [facility] for treatment of her depression. She has met with Dr. Sage as well as counselors and social workers. She does drink alcohol but has no signs of alcohol w/d thus far. Still not very talkative. Started on lexapro.    Review of Systems   Constitutional: Negative.    HENT: Negative.    Eyes: Negative.    Respiratory: Negative.    Cardiovascular: Negative for chest pain, palpitations and leg swelling.   Gastrointestinal: Negative.    Endocrine: Negative.    Genitourinary: Negative.    Musculoskeletal: Negative.    Skin: Negative.    Allergic/Immunologic: Negative.    Neurological: Negative.    Hematological: Negative.    Psychiatric/Behavioral: Positive for decreased concentration and self-injury.    Ext- no leg swelling, no history of blood clots    Past Medical History:   Diagnosis Date   • Depression    • Substance abuse    • Von Willebrand disease    • Willebrand-Juergens disease    ,   Past Surgical History:   Procedure Laterality Date   • INCISION AND DRAINAGE ABSCESS Right     right leg   ,   Family History   Problem Relation Age of Onset   • Hypertension Mother    ,   Social History   Substance Use Topics   • Smoking status: Current Some Day Smoker   • Smokeless tobacco: None      Comment:  socially    • Alcohol use Yes      Comment: socially   ,   No prescriptions prior to admission.   , Scheduled Meds:      escitalopram 5 mg Oral Nightly   , Continuous Infusions:   , PRN Meds:  •  aluminum-magnesium hydroxide-simethicone  •  ibuprofen  •  magnesium hydroxide and Allergies:  Peanut oil and Peanut-containing drug products    Objective      Vital Signs  Temp:  [98.7 °F (37.1 °C)] 98.7 °F (37.1 °C)  Heart Rate:  [45-53] 53  Resp:  [16] 16  BP: ()/(65-69) 99/65    Physical Exam   Constitutional: She is oriented to person, place, and time. She appears well-developed and well-nourished. No distress.   HENT:   Head: Normocephalic and atraumatic.   Mouth/Throat: Oropharynx is clear and moist.   Eyes: EOM are normal. Pupils are equal, round, and reactive to light. No scleral icterus.   Neck: Normal range of motion. Neck supple.   Cardiovascular: Normal rate, regular rhythm and normal heart sounds.    No murmur heard.  Pulmonary/Chest: Effort normal and breath sounds normal.   Abdominal: Soft. Bowel sounds are normal. She exhibits no distension. There is no tenderness.   Genitourinary:   Genitourinary Comments: Deferred    Musculoskeletal: She exhibits no edema or deformity.   Neurological: She is alert and oriented to person, place, and time. No cranial nerve deficit.   Skin: Skin is warm and dry. No rash noted.   Psychiatric: She is slowed. Cognition and memory are normal. She exhibits a depressed mood.   Nursing note and vitals reviewed.      Results Review:    I reviewed the patient's new clinical results.    CBC (No Diff) [[id number]] (Abnormal) Collected: 06/04/17 0722       Lab Status: Final result Specimen: Blood Updated: 06/04/17 0731        WBC 4.97 10*3/mm3         RBC 3.48 (L) 10*6/mm3         Hemoglobin 10.5 (L) g/dL         Hematocrit 32.6 (L) %         MCV 93.7 fL         MCH 30.2 pg         MCHC 32.2 (L) g/dL         RDW 13.8 (H) %         RDW-SD 47.3 fl         MPV 9.8 fL         Platelets  245 10*3/mm3        Comprehensive Metabolic Panel [[id number]] (Abnormal) Collected: 06/04/17 0722       Lab Status: Final result Specimen: Blood Updated: 06/04/17 0829        Glucose 109 (H) mg/dL         BUN 10 mg/dL         Creatinine 0.79 mg/dL         Sodium 144 mmol/L         Potassium 3.8 mmol/L         Chloride 110 (H) mmol/L         CO2 19.7 (L) mmol/L         Calcium 8.0 (L) mg/dL         Total Protein 5.3 (L) g/dL         Albumin 3.40 (L) g/dL         ALT (SGPT) 10 U/L         AST (SGOT) 14 U/L         Alkaline Phosphatase 42 U/L         Total Bilirubin <0.2 mg/dL         eGFR Non African Amer 90 mL/min/1.73         Globulin 1.9 gm/dL         A/G Ratio 1.8 g/dL         BUN/Creatinine Ratio 12.7        Anion Gap 14.3 mmol/L        Protime-INR [[id number]] (Abnormal) Collected: 06/04/17 0722       Lab Status: Final result Specimen: Blood Updated: 06/04/17 0740        Protime 16.7 (H) Seconds         INR 1.40 (H)       Acetaminophen Level [[id number]] (Abnormal) Collected: 06/04/17 0722       Lab Status: Final result Specimen: Blood Updated: 06/04/17 0818        Acetaminophen <5.0 (L) mcg/mL        TSH [[id number]] (Normal) Collected: 06/04/17 0722       Lab Status: Final result Specimen: Blood Updated: 06/04/17 2048        TSH 0.564 mIU/mL        T4, Free [[id number]] (Normal) Collected: 06/04/17 0722       Lab Status: Final result Specimen: Blood Updated: 06/04/17 2048        Free T4 1.06 ng/dL             Assessment/Plan     Principal Problem:    Major depression, recurrent  Active Problems:    Intentional acetaminophen overdose    Suicidal ideation    Anemia    Alcohol use    Continue counseling and treatment for depression and alcohol use in the [facility]. Monitor for signs of alcohol w/d.    Will decrease the dose of prn ibuprofen    Recommend no tylenol be given for the next week and follow up with her PCP after dc for repeat LFTs testing in a week or two.     Thank you for the consult, please call with any  questions.     Assessment & Plan    I discussed the patients findings and my recommendations with patient and nursing staff. Reviewed previous records    Harlan Paez MD  06/05/17  5:21 PM

## 2017-06-05 NOTE — PLAN OF CARE
Problem:  Patient Care Overview (Adult)  Goal: Discharge Needs Assessment  Outcome: Ongoing (interventions implemented as appropriate)    06/04/17 1703 06/05/17 1404 06/05/17 1408   Discharge Needs Assessment   Concerns To Be Addressed --  mental health concerns;discharge planning concerns --    Discharge Planning Comments --  --  establish outpatient psych provider prior to discharge with psychiatrist and therapist and await outcome of LILLY consult for recommendations.   Living Environment   Transportation Available car --  --

## 2017-06-05 NOTE — PLAN OF CARE
Problem: BH Overarching Goals  Goal: Adheres to Safety Considerations for Self and Others  Outcome: Ongoing (interventions implemented as appropriate)  Intervention: Develop/maintain Individualized Safety Plan    06/05/17 1000 06/05/17 1015   Provide Emotional/Physical Safety   Suicidal Ideation --  no   Willingness to Contact Staff Member if Feeling Like Hurting Self --  yes   Mental Health Homicide Risk   Homicidal Ideation --  no   Willingness to Contact Staff Member if Feeling Like Hurting Others --  yes   Safety   Safety Measures safety rounds completed --          Goal: Optimized Coping Skills in Response to Life Stressors  Outcome: Ongoing (interventions implemented as appropriate)  Intervention: Promote Effective Coping Strategies    06/05/17 1015   Coping Strategies   Supportive Measures active listening utilized;verbalization of feelings encouraged         Goal: Develops/Participates in Therapeutic [place] to Support Successful Transition  Outcome: Ongoing (interventions implemented as appropriate)  Intervention: Foster Therapeutic [place]    06/05/17 1015   Coping Strategies   Trust Relationship/Rapport care explained       Intervention: Mutually Develop Transition Plan    06/05/17 1015   OTHER   Transition Support crisis management plan promoted

## 2017-06-05 NOTE — CONSULTS
"Met with pt for consult. Pt reports that she uses alcohol 1x monthly. However, she reports that she drank \"5 drinks\" on Friday which led to hospitalization. Pt may not be fully forthcoming on information and due to current information no recommendation for LILLY treatment is needed. However, we explored potential benefits for psych IOP. Pt seemed optimistic.   "

## 2017-06-05 NOTE — PLAN OF CARE
Problem:  Patient Care Overview (Adult)  Goal: Plan of Care Review  Outcome: Ongoing (interventions implemented as appropriate)    06/04/17 2215 06/05/17 0344   Outcome Evaluation   Outcome Summary/Follow up Plan --  Pt denied all issues, no thoughts to hurt self or others, no medications given this shift, pt isolated to room in bed this shift. Will continue to monitor changes in mood and behaviors, provide feedback and support.   Coping/Psychosocial   Patient Agreement with Plan of Care agrees --    Coping/Psychosocial Response Interventions   Plan Of Care Reviewed With patient --          Problem:  Overarching Goals  Goal: Adheres to Safety Considerations for Self and Others  Intervention: Develop/maintain Individualized Safety Plan    06/04/17 2215 06/05/17 0344   Provide Emotional/Physical Safety   Suicidal Ideation no --    Willingness to Contact Staff Member if Feeling Like Hurting Self --  yes   Mental Health Homicide Risk   Homicidal Ideation no --    Willingness to Contact Staff Member if Feeling Like Hurting Others --  yes   Safety   Safety Measures safety rounds completed;suicide assessment completed --          Goal: Optimized Coping Skills in Response to Life Stressors  Intervention: Promote Effective Coping Strategies    06/04/17 2215   Coping Strategies   Supportive Measures active listening utilized;verbalization of feelings encouraged;self-reflection promoted         Goal: Develops/Participates in Therapeutic [place] to Support Successful Transition  Intervention: Foster Therapeutic [place]    06/04/17 2215   Coping Strategies   Trust Relationship/Rapport care explained;choices provided;emotional support provided;empathic listening provided;questions answered;questions encouraged;reassurance provided;thoughts/feelings acknowledged       Intervention: Mutually Develop Transition Plan    06/04/17 2215   OTHER   Transition Support community resources reviewed
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
## 2017-06-05 NOTE — NURSING NOTE
"Pt is alert and oriented x4. Pt states that she regrets her plan for suicide and that she was not thinking her actions through. Pt states that her plan for suicide was partially fueled by the \"moderate\" amount of alcohol she consumed before buying the tylenol pills. Pt states she was not consuming alcohol or taking any illicit or prescription drugs for \"a long time\" before deciding on her plan for suicide. Pt denies any anxiety or depression, and denies both delusions and hallucinations. Pt states she plans to abstain from alcohol upon discharge and plans to follow through with her follow up care. Will continue to monitor.  "

## 2017-06-05 NOTE — PLAN OF CARE
Problem: BH Patient Care Overview (Adult)  Goal: Interdisciplinary Rounds/Family Conference  Outcome: Ongoing (interventions implemented as appropriate)    06/05/17 1004   Interdisciplinary Rounds/Family Conf   Summary Treatment team met to discuss plan of care. Plan for individual session and LILLY consult.  to seek outpatient referrals.   Participants pharmacy;psychiatrist;social work;nursing      Patient/Guardian Signature: __________________________________             Psychiatrist Signature: ______________________________________             Therapist Signature: ________________________________________              Nurse Signature: ___________________________________________              Staff Signature: ____________________________________________             Staff Signature: ____________________________________________              Staff Signature: ____________________________________________              Staff Signature:                                                                                                      Goal: Individualization and Mutuality  Outcome: Ongoing (interventions implemented as appropriate)    06/05/17 1004   Behavioral Health Screens   Patient Personal Strengths family/social support;stable living environment         Problem: Coping, Compromised Individual (Adult,Obstetrics,Pediatric)  Goal: Identify Related Risk Factors and Signs and Symptoms  Outcome: Outcome(s) achieved Date Met:  06/05/17 06/05/17 1004   Coping, Compromised Individual   Coping, Compromised Individual: Related Risk Factors situational/maturational crisis   Signs and Symptoms (Compromised Individual Coping) inappropriate coping mechanisms;substance abuse       Goal: Effective Coping  Outcome: Ongoing (interventions implemented as appropriate)    Problem: Depression  Goal: Treatment Goal: Demonstrate behavioral control of depressive symptoms, verbalize feelings of improved mood/affect, and adopt new  coping skills prior to discharge  Outcome: Ongoing (interventions implemented as appropriate)  Goal: Verbalize thoughts and feelings associated with:  Outcome: Ongoing (interventions implemented as appropriate)  Goal: Refrain from harming self  Outcome: Ongoing (interventions implemented as appropriate)  Goal: Refrain from isolation  Outcome: Ongoing (interventions implemented as appropriate)  Goal: Refrain from self-neglect  Outcome: Ongoing (interventions implemented as appropriate)  Goal: Attend and participate in unit activities, including therapeutic, recreational, and educational groups  Outcome: Ongoing (interventions implemented as appropriate)  Goal: Complete daily ADLs, including personal hygiene independently, as able  Outcome: Ongoing (interventions implemented as appropriate)    Problem: Self-Concept Disturbance (Adult)  Goal: Identify Related Risk Factors and Signs and Symptoms  Outcome: Outcome(s) achieved Date Met:  06/05/17 06/05/17 1004   Self-Concept Disturbance   Related Risk Factors (Self-Concept Disturbance) relationship problems   Signs and Symptoms (Self-Concept Disturbance) depression       Goal: Psychosocial Coping/Adjustment  Outcome: Ongoing (interventions implemented as appropriate)  Goal: Improved Self-Perception/Self-Esteem  Outcome: Ongoing (interventions implemented as appropriate)

## 2017-06-06 VITALS
DIASTOLIC BLOOD PRESSURE: 64 MMHG | HEART RATE: 54 BPM | SYSTOLIC BLOOD PRESSURE: 99 MMHG | TEMPERATURE: 98.7 F | OXYGEN SATURATION: 99 % | RESPIRATION RATE: 16 BRPM

## 2017-06-06 RX ORDER — ESCITALOPRAM OXALATE 5 MG/1
5 TABLET ORAL EVERY MORNING
Qty: 30 TABLET | Refills: 1 | Status: SHIPPED | OUTPATIENT
Start: 2017-06-06

## 2017-06-06 NOTE — PSYCH
PSYCHIATRIC DISCHARGE SUMMARY    DATE OF ADMISSION:    06/04/2017  DATE OF DISCHARGE:   06/06/2017         PRIMARY CARE PHYSICIAN:  Tila Lawson MD    REASON FOR ADMISSION: The patient is a 21-year-old white female admitted to the [facility] after an ingestion of acetaminophen.     HOSPITAL COURSE: The patient was admitted to the [facility] and placed on suicide precautions. She expressed appropriate contrition over the events leading to hospitalization. She was restarted on escitalopram 5 mg daily. She did complain of some insomnia taking the medication in the evening and it was recommended that she try the medication in the morning.  The patient denied suicidal ideation when seen by this physician on 06/06/2017 and at that point was agreeable with plan for followup in the intensive outpatient program provided by this facility.  As per her request, discharge was ordered.     FINAL DIAGNOSES:  1.  Major depressive disorder, recurrent, moderate.   2.  Alcohol use disorder by history.     DISCHARGE MEDICATIONS:   Lexapro 5 mg daily for depression.     DISCHARGE INSTRUCTIONS: No dietary or physical restrictions were placed on the patient at the time of discharge.     FOLLOWUP: To take place through the auspices of the intensive outpatient program provided by this facility.     PROGNOSIS: Considered good, but will be darkened considerably should she continue to abuse alcohol.        ANDREE Sawyer:xavier  D:   06/06/2017 10:07:59  T:   06/06/2017 10:45:49  Job ID:   [id number]  Document ID:   [id number]  cc:   [facility]*  Tila Lawson M.D.

## 2017-06-06 NOTE — PLAN OF CARE
Problem:  Patient Care Overview (Adult)  Goal: Plan of Care Review  Outcome: Ongoing (interventions implemented as appropriate)    06/05/17 2300 06/06/17 0416   Outcome Evaluation   Outcome Summary/Follow up Plan --  Pt denied all issues, stated she just wants to go home, and is upset. Pt was noted crying after a phone call with her mother. Pt did not want to discuss phone call. Pt refused HS Lexapro, stated she did not talk to the doctor about it and would prefer to do so. Will continue to monitor changes in mood and behaviors, provide feedback and support.   Coping/Psychosocial   Patient Agreement with Plan of Care agrees --    Coping/Psychosocial Response Interventions   Plan Of Care Reviewed With patient --          Problem:  Overarching Goals  Goal: Adheres to Safety Considerations for Self and Others  Intervention: Develop/maintain Individualized Safety Plan    06/05/17 2300   Provide Emotional/Physical Safety   Suicidal Ideation no   Willingness to Contact Staff Member if Feeling Like Hurting Self yes   Mental Health Homicide Risk   Homicidal Ideation no   Willingness to Contact Staff Member if Feeling Like Hurting Others yes   Safety   Safety Measures safety rounds completed;suicide assessment completed         Goal: Optimized Coping Skills in Response to Life Stressors  Intervention: Promote Effective Coping Strategies    06/05/17 2300   Coping Strategies   Supportive Measures active listening utilized;verbalization of feelings encouraged         Goal: Develops/Participates in Therapeutic [place] to Support Successful Transition  Intervention: Foster Therapeutic [place]    06/05/17 2300   Coping Strategies   Trust Relationship/Rapport care explained;choices provided;emotional support provided;empathic listening provided;questions answered;questions encouraged;reassurance provided;thoughts/feelings acknowledged       Intervention: Mutually Develop Transition Plan    06/05/17 2300   OTHER   Transition  Support crisis management plan promoted

## 2017-06-06 NOTE — PLAN OF CARE
Problem:  Patient Care Overview (Adult)  Goal: Plan of Care Review  Outcome: Ongoing (interventions implemented as appropriate)    06/06/17 1215   Outcome Evaluation   Outcome Summary/Follow up Plan Per Dr. Sage's orders, pt to discharge home after family therapy session with her mother. SW met with pt to discuss dc plans. Pt minimized OD and struggle with depression. Pt open and honest stating she likely will not take prescribed medications as she feels the side effects are worse than the depression. SW provided some education on medication management and encouraged a follow up with psychiatrist, but pt declined. Pt seemed somewhat interested in IOP but reports fear of groups so declined. SW provided information and handouts on IOP if pt chooses to do this program in the future. Pt interested in therapy. Pt to see Sunshine Drake with [facility] ([phone]) on 6/14/17 at 8:00am. SW provided pt with local support groups for depression and a list of psychiatrists available if pt chooses to follow up with medication management in the future.   Coping/Psychosocial Response Interventions   Plan Of Care Reviewed With patient   Coping/Psychosocial   Patient Agreement with Plan of Care agrees   Patient Care Overview   Progress improving

## 2017-06-07 ENCOUNTER — TELEPHONE (OUTPATIENT)
Dept: PSYCHIATRY | Facility: HOSPITAL | Age: 22
End: 2017-06-07
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
## 2018-04-09 ENCOUNTER — TRANSCRIBE ORDERS (OUTPATIENT)
Dept: PHYSICAL THERAPY | Facility: CLINIC | Age: 23
End: 2018-04-09

## 2018-04-09 ENCOUNTER — TREATMENT (OUTPATIENT)
Dept: PHYSICAL THERAPY | Facility: CLINIC | Age: 23
End: 2018-04-09

## 2018-04-09 DIAGNOSIS — Z89.432 S/P TRANSMETATARSAL AMPUTATION OF FOOT, LEFT (HCC): ICD-10-CM

## 2018-04-09 DIAGNOSIS — M79.672 LEFT FOOT PAIN: ICD-10-CM

## 2018-04-09 DIAGNOSIS — S91.302D: Primary | ICD-10-CM

## 2018-04-09 DIAGNOSIS — R26.9 GAIT DIFFICULTY: Primary | ICD-10-CM

## 2018-04-09 PROCEDURE — 97161 PT EVAL LOW COMPLEX 20 MIN: CPT | Performed by: PHYSICAL THERAPIST

## 2018-04-09 PROCEDURE — 97110 THERAPEUTIC EXERCISES: CPT | Performed by: PHYSICAL THERAPIST

## 2018-04-09 PROCEDURE — 97116 GAIT TRAINING THERAPY: CPT | Performed by: PHYSICAL THERAPIST
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
## 2018-04-09 NOTE — PATIENT INSTRUCTIONS
HEP provided to patient with picture handouts.  Exercise to be performed 2-3x/day daily. Pt demonstrated good understanding and agreed with established plan.  Pt instructed on gait for level surfaces and for stairs for safety.

## 2018-04-12 ENCOUNTER — TREATMENT (OUTPATIENT)
Dept: PHYSICAL THERAPY | Facility: CLINIC | Age: 23
End: 2018-04-12

## 2018-04-12 DIAGNOSIS — Z89.432 S/P TRANSMETATARSAL AMPUTATION OF FOOT, LEFT (HCC): ICD-10-CM

## 2018-04-12 DIAGNOSIS — R26.9 GAIT DIFFICULTY: Primary | ICD-10-CM

## 2018-04-12 DIAGNOSIS — M79.672 LEFT FOOT PAIN: ICD-10-CM

## 2018-04-12 PROCEDURE — 97110 THERAPEUTIC EXERCISES: CPT | Performed by: PHYSICAL THERAPIST

## 2018-04-12 PROCEDURE — 97112 NEUROMUSCULAR REEDUCATION: CPT | Performed by: PHYSICAL THERAPIST

## 2018-04-12 NOTE — PROGRESS NOTES
Physical Therapy Daily Progress Note  Visits:2    Subjective : Damien Andrade reports: I saw the doctor and he pretty much agreed with all of the suggestions you had to help my recovery (scooter, shower plastic boot, shower seat).  I will have another skin graft surgery soon but not sure when.  The exercises were fine but the foot was a little sore after. The hip and leg was fine.      Objective: L foot still heavily bandaged, no scar/wound visualization.  Ambulates efficiently with crutches.    See Exercise, Manual, and Modality Logs for complete treatment.     Assessment/Plan:Pt tolerated treatment very well and was progressed to include stretching for the glutes, piriformis, quads and hamstrings bilaterally.  Additionally, we did work on some modified weight bearing for the hip joint in tall kneeling and gentle pressure to plantar aspect of foot in sitting.      Progress per Plan of Care and Progress strengthening /stabilization /functional activity       Manual Therapy:    -     mins  80195;  Therapeutic Exercise:    45     mins  43150;     Neuromuscular Jong:    8    mins  34980;    Therapeutic Activity:     -     mins  22184;     Gait Training:      -     mins  31546;     Ultrasound:     -     mins  86291;    Electrical Stimulation:    -     mins  54372 ( );  Dry Needling     -     mins self-pay    Timed Treatment:   53   mins   Total Treatment:     53   mins         KORIN Savage License #[id number]    Physical Therapist

## 2018-04-17 ENCOUNTER — TREATMENT (OUTPATIENT)
Dept: PHYSICAL THERAPY | Facility: CLINIC | Age: 23
End: 2018-04-17

## 2018-04-17 DIAGNOSIS — Z89.432 S/P TRANSMETATARSAL AMPUTATION OF FOOT, LEFT (HCC): ICD-10-CM

## 2018-04-17 DIAGNOSIS — R26.9 GAIT DIFFICULTY: Primary | ICD-10-CM

## 2018-04-17 PROCEDURE — 97110 THERAPEUTIC EXERCISES: CPT | Performed by: PHYSICAL THERAPIST

## 2018-04-17 PROCEDURE — 97112 NEUROMUSCULAR REEDUCATION: CPT | Performed by: PHYSICAL THERAPIST

## 2018-04-17 NOTE — PROGRESS NOTES
Physical Therapy Daily Progress Note  Visits:3    Subjective : Damien Andrade reports: I find out the date of my next skin graft tomorrow. The new exercises from last visit weren't bad.  I was little more sore in the foot over the weekend.      Objective:  L DF: AROM to 22 degrees. (  See Exercise, Manual, and Modality Logs for complete treatment.     Assessment/Plan:Pt is making improvement with DF motion and L LE strength and endurance and noted by aiblity to perform resisted strengthening activity.  We continue to challenge the L LE in weight bearing positions for the hip but not to FWB or PWB into the foot.      Progress per Plan of Care     Manual Therapy:    -     mins  35271;  Therapeutic Exercise:    50     mins  53636;     Neuromuscular Jong:    4    mins  92845;    Therapeutic Activity:     -     mins  27720;     Gait Training:      -     mins  86405;     Ultrasound:     -     mins  03276;    Electrical Stimulation:    -     mins  28668 ( );  Dry Needling     -     mins self-pay    Timed Treatment:   54   mins   Total Treatment:     54   mins        KORIN Savage License #[id number]    Physical Therapist

## 2018-04-19 ENCOUNTER — TREATMENT (OUTPATIENT)
Dept: PHYSICAL THERAPY | Facility: CLINIC | Age: 23
End: 2018-04-19

## 2018-04-19 DIAGNOSIS — R26.9 GAIT DIFFICULTY: Primary | ICD-10-CM

## 2018-04-19 DIAGNOSIS — M79.672 LEFT FOOT PAIN: ICD-10-CM

## 2018-04-19 DIAGNOSIS — Z89.432 S/P TRANSMETATARSAL AMPUTATION OF FOOT, LEFT (HCC): ICD-10-CM

## 2018-04-19 PROCEDURE — 97110 THERAPEUTIC EXERCISES: CPT | Performed by: PHYSICAL THERAPIST

## 2018-04-19 PROCEDURE — 97112 NEUROMUSCULAR REEDUCATION: CPT | Performed by: PHYSICAL THERAPIST

## 2018-04-19 NOTE — PROGRESS NOTES
Physical Therapy Daily Progress Note  Visits:4    Subjective : Quezada Raymond reports: I did fine after last visit.  I still only get some soreness in the foot, nothing in the hip or knee. I saw my doctor yesterday and did inform me that I'll have my next surgery soon but I should only miss a week or so of PT.  He also mentioned that I may have to have another surgery to remove more of my metatarsal.  I may get a second opinion because I don't want another surgery.    Objective: Pt tolerated more partial WB to residual limb for Nu-step and BOSU partial lunge  See Exercise, Manual, and Modality Logs for complete treatment.     Assessment/Plan:Pt was educated on possible rationale behind having another surgery to shorten metatarsal bone (s).  She was prorgressed with good tolerance to activity and is able to place some weight into the residual limb as it gets closer to neutral foot position. She was reminded to continue with ice to the lower leg and as much of the foot as possible with elevation.     Progress per Plan of Care and Progress strengthening /stabilization /functional activity       Manual Therapy:    -     mins  12014;  Therapeutic Exercise:    50     mins  88354;     Neuromuscular Jong:    8    mins  81905;    Therapeutic Activity:     -     mins  76826;     Gait Training:      -     mins  68179;     Ultrasound:     -     mins  45598;    Electrical Stimulation:    -     mins  30550 ( );  Dry Needling     -     mins self-pay    Timed Treatment:   58   mins   Total Treatment:     58   mins    KORIN Savage License #[id number]    Physical Therapist

## 2018-04-25 ENCOUNTER — TREATMENT (OUTPATIENT)
Dept: PHYSICAL THERAPY | Facility: CLINIC | Age: 23
End: 2018-04-25

## 2018-04-25 DIAGNOSIS — Z89.432 S/P TRANSMETATARSAL AMPUTATION OF FOOT, LEFT (HCC): ICD-10-CM

## 2018-04-25 DIAGNOSIS — M79.672 LEFT FOOT PAIN: ICD-10-CM

## 2018-04-25 DIAGNOSIS — R26.9 GAIT DIFFICULTY: Primary | ICD-10-CM

## 2018-04-25 PROCEDURE — 97110 THERAPEUTIC EXERCISES: CPT | Performed by: PHYSICAL THERAPIST

## 2018-04-25 PROCEDURE — 97112 NEUROMUSCULAR REEDUCATION: CPT | Performed by: PHYSICAL THERAPIST

## 2018-04-26 NOTE — PROGRESS NOTES
Physical Therapy Daily Progress Note  Visits:5     Subjective : Damien Andrade reports: No new complaints.  I'll have my skin graft procedure tomorrow.  I talked to my doctors and they explained the importance of possibly having another procedure and I am coming to  with that.     Objective   See Exercise, Manual, and Modality Logs for complete treatment.     Assessment/Plan:Pt tolerated TE and strengthening progression well today.  We encouraged her to perform repetitions of exercise to fatigue to ensure we are adequately challenging her impaired limb.  We continue to find ways to provide weight bearing though the hip, femur and lower leg as tolerated.      Progress per Plan of Care and Progress strengthening /stabilization /functional activity  We will resume PT as MD recommends return following skin grafting procedure.       Manual Therapy:    -     mins  70407;  Therapeutic Exercise:    45     mins  67955;     Neuromuscular Jong:    15    mins  77707;    Therapeutic Activity:     -     mins  53999;     Gait Training:      -     mins  32733;     Ultrasound:     -     mins  95667;    Electrical Stimulation:    -     mins  95493 ( );  Dry Needling     -     mins self-pay    Timed Treatment:   60   mins   Total Treatment:     60   mins      KORIN Savage License #[id number]    Physical Therapist

## 2018-05-08 ENCOUNTER — TREATMENT (OUTPATIENT)
Dept: PHYSICAL THERAPY | Facility: CLINIC | Age: 23
End: 2018-05-08

## 2018-05-08 DIAGNOSIS — R26.9 GAIT DIFFICULTY: Primary | ICD-10-CM

## 2018-05-08 DIAGNOSIS — M79.672 LEFT FOOT PAIN: ICD-10-CM

## 2018-05-08 DIAGNOSIS — Z89.432 S/P TRANSMETATARSAL AMPUTATION OF FOOT, LEFT (HCC): ICD-10-CM

## 2018-05-08 PROCEDURE — 97110 THERAPEUTIC EXERCISES: CPT | Performed by: PHYSICAL THERAPIST

## 2018-05-08 PROCEDURE — 97116 GAIT TRAINING THERAPY: CPT | Performed by: PHYSICAL THERAPIST

## 2018-05-08 NOTE — PROGRESS NOTES
Physical Therapy Daily Progress Note  Visits:6    Subjective : Damien Andrade reports: I had my procedure 18 and it went well.  I don't have any post surgical limitations and the doctor said I can be full weight bearing.  I did take a week off from my exercises.    0/10 currently.      Objective : Gait trained with (B) Crutches, with 3 point,step through gait pattern.  Accepted weight well.  See Exercise, Manual, and Modality Logs for complete treatment.     Assessment/Plan:Pt did very well with gait training activity today.  She was able to perform acceptable rollover with (B) crutch but unable with only 1.  We also worked on static/dynamic balance with (B) LE on ground and supported.      Progress per Plan of Care and Progress strengthening /stabilization /functional activity       Manual Therapy:    -     mins  29239;  Therapeutic Exercise:    35     mins  56718;     Neuromuscular Jong:    5    mins  32653;    Therapeutic Activity:     -     mins  67002;     Gait Trainin     mins  07043;     Ultrasound:     -     mins  29433;    Electrical Stimulation:    -     mins  88354 ( );  Dry Needling     -     mins self-pay    Timed Treatment:   46   mins   Total Treatment:     46   mins      KORIN Savage License #[id number]    Physical Therapist

## 2018-05-10 ENCOUNTER — TREATMENT (OUTPATIENT)
Dept: PHYSICAL THERAPY | Facility: CLINIC | Age: 23
End: 2018-05-10

## 2018-05-10 DIAGNOSIS — M79.672 LEFT FOOT PAIN: ICD-10-CM

## 2018-05-10 DIAGNOSIS — R26.9 GAIT DIFFICULTY: Primary | ICD-10-CM

## 2018-05-10 DIAGNOSIS — Z89.432 S/P TRANSMETATARSAL AMPUTATION OF FOOT, LEFT (HCC): ICD-10-CM

## 2018-05-10 PROCEDURE — 97110 THERAPEUTIC EXERCISES: CPT | Performed by: PHYSICAL THERAPIST

## 2018-05-10 PROCEDURE — 97112 NEUROMUSCULAR REEDUCATION: CPT | Performed by: PHYSICAL THERAPIST

## 2018-05-11 NOTE — PROGRESS NOTES
Physical Therapy Daily Progress Note  Visits:7    Subjective : Damien Andrade reports: The foot was sore after last visit but I was happy that I'm able to accept weight on the foot now.     Objective: Lack of full L ankle DF limiting stance phase  See Exercise, Manual, and Modality Logs for complete treatment.     Assessment/Plan:Pt continues to tolerated treament well and we did emphasise the importance of elevation and ice to keep foot soreness down from increased weight bearing.  We continue to challenge her in WB'ing positions as tolerated.      Progress per Plan of Care and Progress strengthening /stabilization /functional activity         Manual Therapy:    -     mins  58757;  Therapeutic Exercise:    40     mins  83770;     Neuromuscular Jong:    10    mins  80851;    Therapeutic Activity:     -     mins  13740;     Gait Trainin     mins  73932;     Ultrasound:     -     mins  71297;    Electrical Stimulation:    -     mins  17834 ( );  Dry Needling     -     mins self-pay    Timed Treatment:   55   mins   Total Treatment:     65   mins      KORIN Savage License #[id number]    Physical Therapist

## 2018-05-15 ENCOUNTER — TREATMENT (OUTPATIENT)
Dept: PHYSICAL THERAPY | Facility: CLINIC | Age: 23
End: 2018-05-15

## 2018-05-15 DIAGNOSIS — R26.9 GAIT DIFFICULTY: Primary | ICD-10-CM

## 2018-05-15 DIAGNOSIS — Z89.432 S/P TRANSMETATARSAL AMPUTATION OF FOOT, LEFT (HCC): ICD-10-CM

## 2018-05-15 DIAGNOSIS — M79.672 LEFT FOOT PAIN: ICD-10-CM

## 2018-05-15 PROCEDURE — 97110 THERAPEUTIC EXERCISES: CPT | Performed by: PHYSICAL THERAPIST

## 2018-05-15 PROCEDURE — 97116 GAIT TRAINING THERAPY: CPT | Performed by: PHYSICAL THERAPIST

## 2018-05-15 PROCEDURE — 97112 NEUROMUSCULAR REEDUCATION: CPT | Performed by: PHYSICAL THERAPIST

## 2018-05-15 NOTE — PROGRESS NOTES
Physical Therapy Daily Progress Note  Visits:8    Subjective : Damien Andrade reports: I feel pretty good.  The foot is not as sore since I have been putting weight through it more.  The ice pack from last visit was really helpful.  I would like to find one to use at home.      Objective   See Exercise, Manual, and Modality Logs for complete treatment.     Assessment/Plan:Pt did very well with PT intervention today and we continue to progress her WB'ing tolerances.  Her DF limitation continues to hinder better rollover in stance phase of gait.  We did progress her gastrocs/soleus stretches to standing to help with gait.      Progress per Plan of Care and Progress strengthening /stabilization /functional activity       Manual Therapy:    -     mins  91547;  Therapeutic Exercise:    35     mins  80804;     Neuromuscular Jong:    10    mins  84733;    Therapeutic Activity:     -     mins  42861;     Gait Training:      10     mins  93741;     Ultrasound:     -     mins  80165;    Electrical Stimulation:    -     mins  49037 ( );  Dry Needling     -     mins self-pay    Timed Treatment:   55   mins   Total Treatment:     65   mins       KORIN Savage License #[id number]    Physical Therapist

## 2018-05-17 ENCOUNTER — TREATMENT (OUTPATIENT)
Dept: PHYSICAL THERAPY | Facility: CLINIC | Age: 23
End: 2018-05-17

## 2018-05-17 DIAGNOSIS — Z89.432 S/P TRANSMETATARSAL AMPUTATION OF FOOT, LEFT (HCC): ICD-10-CM

## 2018-05-17 DIAGNOSIS — R26.9 GAIT DIFFICULTY: Primary | ICD-10-CM

## 2018-05-17 PROCEDURE — 97110 THERAPEUTIC EXERCISES: CPT | Performed by: PHYSICAL THERAPIST

## 2018-05-17 PROCEDURE — 97112 NEUROMUSCULAR REEDUCATION: CPT | Performed by: PHYSICAL THERAPIST

## 2018-05-17 PROCEDURE — 97116 GAIT TRAINING THERAPY: CPT | Performed by: PHYSICAL THERAPIST

## 2018-05-17 NOTE — PROGRESS NOTES
Physical Therapy Daily Progress Note  Visits:9    Subjective : Damien Andrade reports: I feel pretty good for the most part.  I am done with my wound care treatments and follow up with my surgeon at the beginning of .  I did remember to bring my gripper sock today.      Objective   See Exercise, Manual, and Modality Logs for complete treatment.     Assessment/Plan:Pt did well with activity progression but continues to have DF limitations hindering rollover in gait.  When skin graft healing is complete and manual intervention can be initiated, I suspect improved mobility will be achieved.  Gait training cues for step through on R LE are ongoing.      Progress per Plan of Care and Progress strengthening /stabilization /functional activity         Manual Therapy:    -     mins  13145;  Therapeutic Exercise:    40     mins  15589;     Neuromuscular Jong:    10    mins  04990;    Therapeutic Activity:     -     mins  15566;     Gait Trainin     mins  71481;     Ultrasound:     -     mins  23674;    Electrical Stimulation:    -     mins  84223 ( );  Dry Needling     -     mins self-pay    Timed Treatment:   56   mins   Total Treatment:     66   mins    KORIN Savage License #[id number]    Physical Therapist

## 2018-05-22 ENCOUNTER — TREATMENT (OUTPATIENT)
Dept: PHYSICAL THERAPY | Facility: CLINIC | Age: 23
End: 2018-05-22

## 2018-05-22 DIAGNOSIS — R26.9 GAIT DIFFICULTY: Primary | ICD-10-CM

## 2018-05-22 DIAGNOSIS — M79.672 LEFT FOOT PAIN: ICD-10-CM

## 2018-05-22 DIAGNOSIS — Z89.432 S/P TRANSMETATARSAL AMPUTATION OF FOOT, LEFT (HCC): ICD-10-CM

## 2018-05-22 PROCEDURE — 97112 NEUROMUSCULAR REEDUCATION: CPT | Performed by: PHYSICAL THERAPIST

## 2018-05-22 PROCEDURE — 97110 THERAPEUTIC EXERCISES: CPT | Performed by: PHYSICAL THERAPIST

## 2018-05-22 NOTE — PROGRESS NOTES
Physical Therapy Daily Progress Note  Visits:10    Subjective : Damien Andrade reports: I'm doing much better getting the foot on the floor for walking.  The foot was pretty sore after last visit but it is improving.      Objective: Noted closed chain DF progressing beyond neutral.  Observed L foot wound healing through light gauze bandaging.  Healing well but scabbing still noted with < .5 cm of spot bleeding (dried) at distal end of foot.  See Exercise, Manual, and Modality Logs for complete treatment.     Assessment/Plan:Pt tolerated treatment well overall.  She can now ambulate with acceptable pattern and 1 crutch contralateral to injury limb.  Pt was educated on wrapping foot with more compression to better control swelling.      Progress per Plan of Care and Progress strengthening /stabilization /functional activity         Manual Therapy:         mins  62540;  Therapeutic Exercise:    40     mins  79197;     Neuromuscular Jong:    10    mins  99575;    Therapeutic Activity:     5     mins  90776;   Compression ace wrapping + education  Gait Training:      -     mins  63206;     Ultrasound:     -     mins  30725;    Electrical Stimulation:    -     mins  98509 ( );  Dry Needling     -     mins self-pay    Timed Treatment:   55   mins   Total Treatment:     65   mins    KORIN Savage License #[id number]    Physical Therapist

## 2018-05-24 ENCOUNTER — TREATMENT (OUTPATIENT)
Dept: PHYSICAL THERAPY | Facility: CLINIC | Age: 23
End: 2018-05-24

## 2018-05-24 DIAGNOSIS — R26.9 GAIT DIFFICULTY: Primary | ICD-10-CM

## 2018-05-24 DIAGNOSIS — M79.672 LEFT FOOT PAIN: ICD-10-CM

## 2018-05-24 DIAGNOSIS — Z89.432 S/P TRANSMETATARSAL AMPUTATION OF FOOT, LEFT (HCC): ICD-10-CM

## 2018-05-24 PROCEDURE — 97110 THERAPEUTIC EXERCISES: CPT | Performed by: PHYSICAL THERAPIST

## 2018-05-24 PROCEDURE — 97112 NEUROMUSCULAR REEDUCATION: CPT | Performed by: PHYSICAL THERAPIST

## 2018-05-24 NOTE — PROGRESS NOTES
Physical Therapy Daily Progress Note  Visits:11    Subjective : Damien Andrade reports: Doing pretty good.  The hip is feeling a little tight but otherwise, coming along.    Outcomes measure: Lower extremity Functional scale: 44/80.    Objective:   See Exercise, Manual, and Modality Logs for complete treatment.     Assessment/Plan:Pt tolerated treatment very well. She was able to perform (B) leg press today and now ambulating with use of 1 crutch with acceptable pattern and good WBing on L leg.      Progress per Plan of Care     Manual Therapy:    -     mins  25085;  Therapeutic Exercise:    38     mins  66215;     Neuromuscular Jong:    15    mins  14582;    Therapeutic Activity:     -     mins  74236;     Gait Trainin     mins  50044;     Ultrasound:     -     mins  70757;    Electrical Stimulation:    -     mins  55918 ( );  Dry Needling     -     mins self-pay    Timed Treatment:   58   mins   Total Treatment:     68   mins    KORIN Savage License #[id number]    Physical Therapist

## 2018-05-29 ENCOUNTER — TREATMENT (OUTPATIENT)
Dept: PHYSICAL THERAPY | Facility: CLINIC | Age: 23
End: 2018-05-29

## 2018-05-29 DIAGNOSIS — Z89.432 S/P TRANSMETATARSAL AMPUTATION OF FOOT, LEFT (HCC): ICD-10-CM

## 2018-05-29 DIAGNOSIS — M79.672 LEFT FOOT PAIN: ICD-10-CM

## 2018-05-29 DIAGNOSIS — R26.9 GAIT DIFFICULTY: Primary | ICD-10-CM

## 2018-05-29 PROCEDURE — 97110 THERAPEUTIC EXERCISES: CPT | Performed by: PHYSICAL THERAPIST

## 2018-05-29 PROCEDURE — 97112 NEUROMUSCULAR REEDUCATION: CPT | Performed by: PHYSICAL THERAPIST

## 2018-05-29 NOTE — PROGRESS NOTES
Physical Therapy Daily Progress Note         Visit: 12    Subjective : Damien Andrade reports: I feel pretty good overall today. The foot is still a little swollen but seems to be going down some.  I'm walking more without my crutches which is nice but I still kick my knee back.      Objective   See Exercise, Manual, and Modality Logs for complete treatment.     Assessment/Plan:Pt tolerated treatment very well today and was able to perform all transfers and gait between activities without using crutches.  Noted limp but full weight bearing through stance phase.  Still ongoing closed chain DF limitation affecting gait and leading to some knee hyperextension.  We were able to perform recumbent bike with good resistance to stress CV system and promote LE endurance.      Progress per Plan of Care     Manual Therapy:    -     mins  34277;  Therapeutic Exercise:    45     mins  74357;     Neuromuscular Jong:    6    mins  91105;    Therapeutic Activity:     5     mins  26972;     Gait Trainin     mins  71769;     Ultrasound:     -     mins  61523;    Electrical Stimulation:    -     mins  52062 ( );  Dry Needling     -     mins self-pay    Timed Treatment:   61   mins   Total Treatment:     61   mins    KORIN Savage License #[id number]    Physical Therapist

## 2018-05-31 ENCOUNTER — TREATMENT (OUTPATIENT)
Dept: PHYSICAL THERAPY | Facility: CLINIC | Age: 23
End: 2018-05-31

## 2018-05-31 DIAGNOSIS — M79.672 LEFT FOOT PAIN: ICD-10-CM

## 2018-05-31 DIAGNOSIS — R26.9 GAIT DIFFICULTY: Primary | ICD-10-CM

## 2018-05-31 DIAGNOSIS — Z89.432 S/P TRANSMETATARSAL AMPUTATION OF FOOT, LEFT (HCC): ICD-10-CM

## 2018-05-31 PROCEDURE — 97112 NEUROMUSCULAR REEDUCATION: CPT | Performed by: PHYSICAL THERAPIST

## 2018-05-31 PROCEDURE — 97110 THERAPEUTIC EXERCISES: CPT | Performed by: PHYSICAL THERAPIST

## 2018-05-31 NOTE — PROGRESS NOTES
Physical Therapy Daily Progress Note  Visits:2    Subjective : Damien Andrade reports: Doing good.  I feel a little tired but otherwise doing okay.  I was able to squeeze into one of my shoes but it was very tight.  (Pt recommended to try getting a walking shoe that may fit better than casual shoe)     Objective   See Exercise, Manual, and Modality Logs for complete treatment.     Assessment/Plan:Pt continues to tolerate treatment well and progressing with dynamic stability capacity.  She is nearly SLS on L residual limb for balance and rollover in gait is improving.  Compression garment to foot would be beneficial to controlling edema better than ace wrap at this point.      Progress per Plan of Care     Manual Therapy:    -     mins  79660;  Therapeutic Exercise:    40     mins  81747;     Neuromuscular Jong:    15    mins  54699;    Therapeutic Activity:     -     mins  32148;     Gait Training:      --     mins  92832;     Ultrasound:     -     mins  17484;    Electrical Stimulation:    -     mins  11966 ( );  Dry Needling     -     mins self-pay    Timed Treatment:   55   mins   Total Treatment:     55   mins    KORIN Savage License #[id number]    Physical Therapist

## 2018-06-20 ENCOUNTER — TREATMENT (OUTPATIENT)
Dept: PHYSICAL THERAPY | Facility: CLINIC | Age: 23
End: 2018-06-20

## 2018-06-20 DIAGNOSIS — Z89.432 S/P TRANSMETATARSAL AMPUTATION OF FOOT, LEFT (HCC): ICD-10-CM

## 2018-06-20 DIAGNOSIS — R26.9 GAIT DIFFICULTY: Primary | ICD-10-CM

## 2018-06-20 DIAGNOSIS — M79.672 LEFT FOOT PAIN: ICD-10-CM

## 2018-06-20 PROCEDURE — 97112 NEUROMUSCULAR REEDUCATION: CPT | Performed by: PHYSICAL THERAPIST

## 2018-06-20 PROCEDURE — 97110 THERAPEUTIC EXERCISES: CPT | Performed by: PHYSICAL THERAPIST

## 2018-06-20 NOTE — PROGRESS NOTES
Physical Therapy Daily Progress Note    Visit # : 3  Damien Andrade reports: I've been able to get my foot in a shoe; did a little too much the first day and had some swelling but overall I'm doing well.     Subjective     Objective   See Exercise, Manual, and Modality Logs for complete treatment.       Assessment/Plan  Pt exhibited difficulty descending stair due to lack of ankle DF/hip stability.  Good tolerance to exercise progression.  Pt education on filling shoe with rolled up sock to avoid translation within shoe.  Progress strengthening /stabilization /functional activity           Manual Therapy:    -     mins  32456;  Therapeutic Exercise:    40     mins  11009;     Neuromuscular Jong:    15    mins  50879;    Therapeutic Activity:     -     mins  09973;     Gait Training:      -     mins  04969;     Ultrasound:     -     mins  07496;    Electrical Stimulation:    -     mins  07394 ( );  Iontophoresis                 -     mins 18657      Timed Treatment:   55   mins   Total Treatment:     55   mins        Maite James, PT  Physical Therapist  [place] License # [id number]

## 2018-06-20 NOTE — PATIENT INSTRUCTIONS
Access Code: [email or web address]   URL: [email or web address]   Date: 06/20/2018   Prepared by: Gracia James     Exercises   Half Kneel Ankle Dorsiflexion Self-Mobilization - 10 reps - 1 sets - 10 hold - 1x daily   Half Kneeling Calf Mobilization with Stick - 10 reps - 1 sets - 10 hold - 1x daily   Runner's Touch - 10 reps - 1 sets - 1x daily   Walking Tandem Stance - 10 reps - 1 sets - 1x daily   Lateral Step Down - 10 reps - 2 sets - 1x daily

## 2018-06-22 ENCOUNTER — TREATMENT (OUTPATIENT)
Dept: PHYSICAL THERAPY | Facility: CLINIC | Age: 23
End: 2018-06-22

## 2018-06-22 DIAGNOSIS — M79.672 LEFT FOOT PAIN: ICD-10-CM

## 2018-06-22 DIAGNOSIS — Z89.432 S/P TRANSMETATARSAL AMPUTATION OF FOOT, LEFT (HCC): ICD-10-CM

## 2018-06-22 DIAGNOSIS — R26.9 GAIT DIFFICULTY: Primary | ICD-10-CM

## 2018-06-22 PROCEDURE — 97110 THERAPEUTIC EXERCISES: CPT | Performed by: PHYSICAL THERAPIST

## 2018-06-22 PROCEDURE — 97112 NEUROMUSCULAR REEDUCATION: CPT | Performed by: PHYSICAL THERAPIST

## 2018-06-22 NOTE — PROGRESS NOTES
Physical Therapy Daily Progress Note    Visit # : 4  Damien Andrade reports: I put a sock in my shoe to fill up my shoe.  Sees the podiatrist after next PT visit on Tuesday.  My foot has been swelling but overall feels good to be in a shoe.     Subjective     Objective   See Exercise, Manual, and Modality Logs for complete treatment.       Assessment/Plan  Pt is progressing well with functional/balance activities.  Pt demonstrates good understanding of exercise form.    Progress strengthening /stabilization /functional activity           Manual Therapy:    -     mins  99340;  Therapeutic Exercise:    40     mins  41332;     Neuromuscular Jong:    15    mins  68229;    Therapeutic Activity:     -     mins  26554;     Gait Training:      -     mins  82194;     Ultrasound:     -     mins  21819;    Electrical Stimulation:    -     mins  07965 ( );  Iontophoresis                 -     mins 87159      Timed Treatment:   55   mins   Total Treatment:     55   mins        Maite James, PT  Physical Therapist  [place] License # [id number]

## 2018-06-26 ENCOUNTER — TREATMENT (OUTPATIENT)
Dept: PHYSICAL THERAPY | Facility: CLINIC | Age: 23
End: 2018-06-26

## 2018-06-26 DIAGNOSIS — Z89.432 S/P TRANSMETATARSAL AMPUTATION OF FOOT, LEFT (HCC): ICD-10-CM

## 2018-06-26 DIAGNOSIS — R26.9 GAIT DIFFICULTY: Primary | ICD-10-CM

## 2018-06-26 DIAGNOSIS — M79.672 LEFT FOOT PAIN: ICD-10-CM

## 2018-06-26 PROCEDURE — 97530 THERAPEUTIC ACTIVITIES: CPT | Performed by: PHYSICAL THERAPIST

## 2018-06-26 PROCEDURE — 97110 THERAPEUTIC EXERCISES: CPT | Performed by: PHYSICAL THERAPIST

## 2018-06-26 PROCEDURE — 97112 NEUROMUSCULAR REEDUCATION: CPT | Performed by: PHYSICAL THERAPIST

## 2018-06-26 NOTE — PROGRESS NOTES
Physical Therapy Progress Note                                                                                       Visits:16    Subjective : Damien Andrade reports: I go to my podiatrist today to see if they can make some type of orthotic or insert for my shoes.  Since my last visit, I have not had any doctor appointments.  I don't have any pain but the foot is still swollen some.  I was able to get into a laced shoe about  and have been getting around better.      Objective :  Active Range of Motion   Left Ankle/Foot   Dorsiflexion (ke): 10 degrees. (passive ROM: 18 degrees)    Inversion: 25 degrees   Eversion: 18 degrees     Additional Active Range of Motion Details  Resting DF Left: 45 degrees, active DF 34 degrees     Strength/Myotome Testing      Left Hip   Planes of Motion   Left hip flexors strength: 5-/5.  Extension: 5-  Abduction: 5  Adduction: 5  External rotation: 5-  Internal rotation: 5-     Right Hip   Normal muscle strength     Left Knee   Flexion: 5  Prone flexion: 5-  Quadriceps contraction: good     Right Knee   Normal strength     Ambulation      Ambulation: Level Surfaces   Ambulation with assistive device: independent     Ambulation: Stairs   Ascend stairs: independent  Ascending: Independent  Railings: none  Descend stairs: independent  Descending: independent.  Railings: none  Curbs: independent      Comments   TUG: at eval: 15.09 seconds, 14.43 seconds (with bilateral axial crutches), good form, coordination and use of AD.   TU18: 8.6 sec. Without crutches and with (B) shoes donned.    Functional Assessment      Single Leg Squat      Right Leg  Within functional limits.      Single Leg Stance   Right: 30 (With no LOB) seconds  Left: 18: 15 sec.  (no pain reported) Not tested at eval, unable  See Exercise, Manual, and Modality Logs for complete treatment.     Assessment/Plan:Damien is doing very well with her recovery and we are progressing to dynamic activity with  shoes donned and performs well.  We are recommending ongoing PT for activity progression and initiation of manual therapy on L talocrural joint to improve roll over in gait and eccentric loading going down stairs.  She has been recommended to look into compression socks to control swelling and was encouraged to work back into gym activity.         Progress per Plan of Care and Progress strengthening /stabilization /functional activity       Manual Therapy:    -     mins  51210;  Therapeutic Exercise:    48     mins  83880;     Neuromuscular Jong:    12    mins  33477;    Therapeutic Activity:     10     mins  31099;   Tests and measures  Gait Trainin     mins  37452;     Ultrasound:     -     mins  00270;    Electrical Stimulation:    -     mins  05421 ( );  Dry Needling     -     mins self-pay    Timed Treatment:   75   mins   Total Treatment:     75   mins    Buck Vicente PT  [place] License #[id number]    Physical Therapist

## 2018-07-09 ENCOUNTER — TREATMENT (OUTPATIENT)
Dept: PHYSICAL THERAPY | Facility: CLINIC | Age: 23
End: 2018-07-09

## 2018-07-09 DIAGNOSIS — M79.672 LEFT FOOT PAIN: ICD-10-CM

## 2018-07-09 DIAGNOSIS — R26.9 GAIT DIFFICULTY: Primary | ICD-10-CM

## 2018-07-09 DIAGNOSIS — Z89.432 S/P TRANSMETATARSAL AMPUTATION OF FOOT, LEFT (HCC): ICD-10-CM

## 2018-07-09 PROCEDURE — 97110 THERAPEUTIC EXERCISES: CPT | Performed by: PHYSICAL THERAPIST

## 2018-07-09 PROCEDURE — 97112 NEUROMUSCULAR REEDUCATION: CPT | Performed by: PHYSICAL THERAPIST

## 2018-07-09 PROCEDURE — 97140 MANUAL THERAPY 1/> REGIONS: CPT | Performed by: PHYSICAL THERAPIST

## 2018-08-09 ENCOUNTER — TREATMENT (OUTPATIENT)
Dept: PHYSICAL THERAPY | Facility: CLINIC | Age: 23
End: 2018-08-09

## 2018-08-09 DIAGNOSIS — Z89.432 S/P TRANSMETATARSAL AMPUTATION OF FOOT, LEFT (HCC): ICD-10-CM

## 2018-08-09 DIAGNOSIS — M79.672 LEFT FOOT PAIN: ICD-10-CM

## 2018-08-09 DIAGNOSIS — R26.9 GAIT DIFFICULTY: Primary | ICD-10-CM

## 2018-08-09 PROCEDURE — 97530 THERAPEUTIC ACTIVITIES: CPT | Performed by: PHYSICAL THERAPIST

## 2018-08-09 PROCEDURE — 97140 MANUAL THERAPY 1/> REGIONS: CPT | Performed by: PHYSICAL THERAPIST

## 2018-08-09 PROCEDURE — 97110 THERAPEUTIC EXERCISES: CPT | Performed by: PHYSICAL THERAPIST

## 2018-08-09 NOTE — PROGRESS NOTES
"Physical Therapy Daily Progress Note  Visits:7    Subjective : Damien Andrade reports: I finally got my foot prosthetic and it has been very difficult to get used to.  I am also having some ankle restriction that I want to get worked on as well.      Objective:  Prosthetic does not allow for passive \"forefoot\" extension and rollover.  Footwear may be 1/2 size too small for new orthotic and optimal comfort.     See Exercise, Manual, and Modality Logs for complete treatment.     Assessment/Plan:Pt was able to ambulate on TM without insert with acceptable pattern and rollover.  We did address foot and ankle joint and soft tissue mobility manually.  She reported less percievable restriction following session.  We will continue to follow up over the next few weeks, 1x/week as she slowly increases her orthortic wear time.      Progress per Plan of Care       Manual Therapy:    12     mins  84220;  Therapeutic Exercise:    20     mins  65737;     Neuromuscular Jong:    -    mins  60934;    Therapeutic Activity:     10     mins  93482;   Shoe fitting, orthotic manipulation  Gait Training:      -     mins  28244;     Ultrasound:     -     mins  51081;    Electrical Stimulation:    -     mins  04885 ( );  Dry Needling     -     mins self-pay    Timed Treatment:   52   mins   Total Treatment:     52   mins    KORIN Savage License #[id number]    Physical Therapist  "

## 2018-08-27 ENCOUNTER — TREATMENT (OUTPATIENT)
Dept: PHYSICAL THERAPY | Facility: CLINIC | Age: 23
End: 2018-08-27

## 2018-08-27 DIAGNOSIS — R26.9 GAIT DIFFICULTY: Primary | ICD-10-CM

## 2018-08-27 DIAGNOSIS — M79.672 LEFT FOOT PAIN: ICD-10-CM

## 2018-08-27 DIAGNOSIS — Z89.432 S/P TRANSMETATARSAL AMPUTATION OF FOOT, LEFT (HCC): ICD-10-CM

## 2018-08-27 PROCEDURE — 97110 THERAPEUTIC EXERCISES: CPT | Performed by: PHYSICAL THERAPIST

## 2018-08-27 PROCEDURE — 97112 NEUROMUSCULAR REEDUCATION: CPT | Performed by: PHYSICAL THERAPIST

## 2018-08-27 PROCEDURE — 97530 THERAPEUTIC ACTIVITIES: CPT | Performed by: PHYSICAL THERAPIST

## 2018-08-28 NOTE — PROGRESS NOTES
Physical Therapy Daily Progress Note  Visits:8    Subjective : Quezada Andrade reports: I took an out of town trip and I did pretty good overall but did a lot of walking on beach and I think it made my foot swell some.  I had more difficulty getting my shoe on with the orthotic in and it was uncomfortable.  Otherwise, I'm getting more balanced and confident on the foot.      Objective   See Exercise, Manual, and Modality Logs for complete treatment.     Assessment/Plan:Pt tolerated treatment progression to dynamic stability and plyometric training very well.  We provided her with several options of activity she can participate in at the gym on her own time.  She was agreeable with the progression and performed all tasks well.      Progress per Plan of Care and Progress strengthening /stabilization /functional activity       Manual Therapy:    -     mins  84706;  Therapeutic Exercise:    30     mins  42340;     Neuromuscular Jong:    10    mins  81452;    Therapeutic Activity:     8     mins  50676;     Gait Training:      -     mins  61059;     Ultrasound:     -     mins  43024;    Electrical Stimulation:    -     mins  32070 ( );  Dry Needling     -     mins self-pay    Timed Treatment:   48   mins   Total Treatment:     58   mins    Buck Vicente PT  [place] License #[id number]    Physical Therapist

## 2018-09-05 ENCOUNTER — TREATMENT (OUTPATIENT)
Dept: PHYSICAL THERAPY | Facility: CLINIC | Age: 23
End: 2018-09-05

## 2018-09-05 DIAGNOSIS — Z89.432 S/P TRANSMETATARSAL AMPUTATION OF FOOT, LEFT (HCC): ICD-10-CM

## 2018-09-05 DIAGNOSIS — M79.672 LEFT FOOT PAIN: ICD-10-CM

## 2018-09-05 DIAGNOSIS — R26.9 GAIT DIFFICULTY: Primary | ICD-10-CM

## 2018-09-05 PROCEDURE — 97530 THERAPEUTIC ACTIVITIES: CPT | Performed by: PHYSICAL THERAPIST

## 2018-09-05 PROCEDURE — 97140 MANUAL THERAPY 1/> REGIONS: CPT | Performed by: PHYSICAL THERAPIST

## 2018-09-05 PROCEDURE — 97110 THERAPEUTIC EXERCISES: CPT | Performed by: PHYSICAL THERAPIST

## 2018-09-05 PROCEDURE — 97112 NEUROMUSCULAR REEDUCATION: CPT | Performed by: PHYSICAL THERAPIST

## 2018-09-05 NOTE — PROGRESS NOTES
Physical Therapy Daily Progress Note  Visits:9    Subjective : Damien Andrade reports: I have been feeling pretty good.  I went hiking for a few hours this past weekend.  I didn't have hiking boots but used my walking shoes and seemed to do fine.  I am still having some trouble wearing the orthotic for several hours at a time though.      Objective   See Exercise, Manual, and Modality Logs for complete treatment.     Assessment/Plan:Pt continues to tolerate progressive TE and dynamic activity very well.  Plymometric activity performed independently and with no increased pain following.  Manual intervention to decrease joint compression reported as helpful.  Pt will be seen on PRN basis moving forward but was encouraged to do as much as she is comfortable doing with no restrictions.  We will keep her chart open as needed for follow up appointments.      Progress per Plan of Care     Manual Therapy:    12     mins  32681;  Therapeutic Exercise:    15     mins  56670;     Neuromuscular Jong:    10    mins  80528;    Therapeutic Activity:     8     mins  52229;     Gait Training:      -     mins  37807;     Ultrasound:     -     mins  81605;    Electrical Stimulation:    -     mins  36727 ( );  Dry Needling     -     mins self-pay    Timed Treatment:   45   mins   Total Treatment:     45   mins    KORIN Savage License #[id number]    Physical Therapist

## 2021-04-16 ENCOUNTER — BULK ORDERING (OUTPATIENT)
Dept: CASE MANAGEMENT | Facility: OTHER | Age: 26
End: 2021-04-16

## 2021-04-16 DIAGNOSIS — Z23 IMMUNIZATION DUE: ICD-10-CM
